# Patient Record
Sex: FEMALE | Race: BLACK OR AFRICAN AMERICAN | NOT HISPANIC OR LATINO | URBAN - METROPOLITAN AREA
[De-identification: names, ages, dates, MRNs, and addresses within clinical notes are randomized per-mention and may not be internally consistent; named-entity substitution may affect disease eponyms.]

---

## 2020-06-08 ENCOUNTER — LAB OUTSIDE AN ENCOUNTER (OUTPATIENT)
Dept: URBAN - METROPOLITAN AREA TELEHEALTH 2 | Facility: TELEHEALTH | Age: 50
End: 2020-06-08

## 2020-06-08 ENCOUNTER — OFFICE VISIT (OUTPATIENT)
Dept: URBAN - METROPOLITAN AREA TELEHEALTH 2 | Facility: TELEHEALTH | Age: 50
End: 2020-06-08
Payer: SELF-PAY

## 2020-06-08 DIAGNOSIS — R19.7 DIARRHEA: ICD-10-CM

## 2020-06-08 DIAGNOSIS — Z79.899 LONG-TERM USE OF IMMUNOSUPPRESSANT MEDICATION: ICD-10-CM

## 2020-06-08 DIAGNOSIS — K50.00 CROHN'S DISEASE OF ILEUM WITHOUT COMPLICATION: ICD-10-CM

## 2020-06-08 PROCEDURE — 99215 OFFICE O/P EST HI 40 MIN: CPT | Performed by: INTERNAL MEDICINE

## 2020-06-08 PROCEDURE — G9903 PT SCRN TBCO ID AS NON USER: HCPCS | Performed by: INTERNAL MEDICINE

## 2020-06-08 PROCEDURE — 1036F TOBACCO NON-USER: CPT | Performed by: INTERNAL MEDICINE

## 2020-06-08 PROCEDURE — 99213 OFFICE O/P EST LOW 20 MIN: CPT | Performed by: INTERNAL MEDICINE

## 2020-06-08 RX ORDER — CYANOCOBALAMIN 1000 UG/ML
1 ML ONCE A  WEEK INJECTION INTRAMUSCULAR; SUBCUTANEOUS
Qty: 1 | Refills: 11 | Status: ACTIVE | COMMUNITY
Start: 2019-09-06 | End: 2020-08-31

## 2020-06-08 RX ORDER — DIPHENHYDRAMINE HCL 2 %
AS DIRECTED CREAM (GRAM) TOPICAL
Qty: 30 | Refills: 0 | OUTPATIENT
Start: 2020-06-08 | End: 2020-07-08

## 2020-06-08 RX ORDER — ACETAMINOPHEN 650 MG
2 TABLETS AS NEEDED TABLET, EXTENDED RELEASE ORAL
Qty: 10 | Refills: 0 | OUTPATIENT
Start: 2020-06-08 | End: 2020-06-09

## 2020-06-08 RX ORDER — DICYCLOMINE HYDROCHLORIDE 20 MG/1
TAKE 1 TABLET BY ORAL ROUTE 4 TIMES A DAY AS NEEDED FOR 90 DAYS TABLET ORAL
Qty: 360 | Refills: 1 | Status: ACTIVE | COMMUNITY
Start: 2020-01-27 | End: 2020-07-25

## 2020-06-08 RX ORDER — AZATHIOPRINE 50 MG/1
AS DIRECTED TABLET ORAL
Qty: 30 | Refills: 2 | OUTPATIENT
Start: 2020-06-08 | End: 2020-09-06

## 2020-06-08 RX ORDER — HYDROCORTISONE SODIUM SUCCINATE 100 MG/2ML
AS DIRECTED INJECTION, POWDER, FOR SOLUTION INTRAMUSCULAR; INTRAVENOUS
Qty: 1 | Refills: 0 | OUTPATIENT
Start: 2020-06-08 | End: 2020-06-09

## 2020-06-08 RX ORDER — MYCOPHENOLATE MOFETIL 250 MG/1
TAKE 4 CAPSULES (1,000 MG) BY ORAL ROUTE 2 TIMES PER DAY CAPSULE ORAL 2
Qty: 0 | Refills: 0 | Status: ACTIVE | COMMUNITY
Start: 1900-01-01 | End: 1900-01-01

## 2020-06-08 RX ORDER — ESOMEPRAZOLE MAGNESIUM 40 MG
TAKE 1 CAPSULE BY ORAL ROUTE 2 TIMES A DAY FOR 90 DAYS CAPSULE,DELAYED RELEASE (ENTERIC COATED) ORAL 2
Qty: 180 | Refills: 2 | Status: ACTIVE | COMMUNITY
Start: 2019-10-24 | End: 2020-07-20

## 2020-06-08 RX ORDER — ERGOCALCIFEROL 1.25 MG/1
CAPSULE ORAL
Qty: 0 | Refills: 0 | Status: ACTIVE | COMMUNITY
Start: 1900-01-01 | End: 1900-01-01

## 2020-06-08 NOTE — HPI-OTHER HISTORIES
Pt Sonali is a 48 y/o indiv with ileal CD, currently on Remicade (every 4 weeks 5mg/kg), here for refractory disease.  Pt referred by Dr. Felder.  Pt was initially diagnosed with UC (in [place]) and then CD.  She has colectomy 1983 (9th grade).  She has liver transplant 2011 (on MMF and tacro).  She has not been on a lot of meds for her CD since her liver transplant.  She has also been on Remicade in the past and did really well with this, but it was not continued after liver transplant due to concerns about whether it would be ok after transplant. After starting Entyvio (around 5/2019) she felt really well.  Previously on 10/24/2019, pt reports that she has a lot of diarrhea and has been experiencing a lot of dehydration.  She has had a w/u of diarrhea (nl pancreatic elastase).  She has been adjusting her entyvio from every 6 weeks then to every 4 weeks.  She is on prednisone 5mg (tapered from dose of 40mg).  She has to empty out her ostomy about 4 times per day.  She has abdominal pain at the site of her stoma and gets very dehydrated.  She subjectively has noted that adjusting the dose of entyvio has improved somewhat.  Has lost some weight (after illness)  Previously, pt reports that she continues to have lot of diarrhea, lot of breakthrough sxs within week after her infusion.  Still on steroids.  Getting dehydrated;  PPI was not approved by insurance. Previously on 1/27/2020, pt reports that she has been feeling ill still after being discharged from hospital last week. Lot of diarrhea, lot of abdominal pain.  Previously on 3/9/2020, pt reports that after her first infusion of Remicade (without loading), she felt great.  She had some breakthrough sxs about 4 weeks after the remicade infusion.  She has tapered off of prednisone.  After getting her Remicade, her sxs improved.  Today on 6/8/2020, pt reports that she was hospitalized for flare and dehydration.  Her transplant liver doctor is okay with change to Imuran (not MTX).  She feels better after each Remicade infusion.  However, she feels ill about about 2 weeks afterwards.  SHe is on prednisone 40mg and is tapering.  Her BG are very high.  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  SH: Works as a coordinator   Labs: 4/14/2020: Remicade (trough at 4 weeks) 0 (undetected) and  12/2019: wbc 11.1, ast 121, alt 115, Cr 1.89,  9/19/2019: vedo level 36, Ab neg (infusion was given on 9/9/2019) C diff neg at OSH  5/2019: quant gold neg, Hep B neg; Hep C pos  1/2020:  1/30/2020: Colonoscopy via stoma The scope was advanced 40cm from the ostomy entry site. Patchy inflammation, moderate in severity and characterized by erosions, erythema, friability and granularity was found in the proximal ileum.  Biopsies were taken with a cold forceps for histology.  The pathology specimen was placed into Bottle Number 1. The proximal ileum appeared normal. Diffuse inflammation characterized by erosions, erythema, friability and granularity was found in the distal ileum. Biopsies were taken with a cold forceps for histology.  The pathology specimen was placed into Bottle Number 2. Overall, the proximal mucosa was normal.  The disease appears to be concentrated distally with area of normal mucosa proximal to this and then a skip lesions proximally (as per above) and then normal proxima to this.  A. Proximal Terminal I leum , Biopsy: -Moderate chronic active enteritis (see comment) -No granulomas, dysplasia, or malignancy. B. Terminal I leum , Biopsy: -Moderate chronic active enteritis (see comment) -No granulomas, dysplasia, or malignancy.  4/2019: Colonoscopy A. The findings are consistent with history of Crohn's disease. However, the findings are not specific. The  differential diagnosis also includes enteritis associated with NSAIDs or other medication, mucosal trauma. prolapse, or infection. No viral inclusions are seen. As always, primary inflammatory bowel disease is a  diagnosis of exclusion, other possible etiologies need to be clinically excluded.   A. Smal l Bowel , Biopsy: -Acute ileitis with erosion, consistent with clinical history of Crohn's, see comment. -No granulomas, negative for dysplasia or malignancy.  8/2016: mild active ileitis

## 2020-06-08 NOTE — HPI-TODAY'S VISIT:
More than half of the face-to-face time used for counseling and coordination of care. Patient seen today via telehealth by agreement and consent of patient in light of current COVID-19 pandemic. I used video conferencing during the visit. The patient encounter is appropriate and reasonable under the circumstances given the patient's particular presentation at this time. The patient has been advised of the followin) the potential risks and limitations of this mode of treatment (including but not limited to the absence of in-person examination); 2) the right to refuse telehealth services at any point without affecting the right to future care; 3) the right to receive in-person services, included immediately after this consultation if an urgent need arises; 4) information, including identifiable images or information from this telehealth consult, will only be shared in accordance with HIPAA regulations. Any and all of the patient's and/or patient's family member's questions on this issue have been answered. The patient has verbally consented to be treated via telehealth services. The patient has also been advised to contact this office for worsening conditions or problems, and seek emergency medical treatment and/or call 911 if the patient deems either necessary.

## 2020-06-09 ENCOUNTER — CLAIMS CREATED FROM THE CLAIM WINDOW (OUTPATIENT)
Dept: URBAN - METROPOLITAN AREA CLINIC 97 | Facility: CLINIC | Age: 50
End: 2020-06-09

## 2020-06-09 ENCOUNTER — CLAIMS CREATED FROM THE CLAIM WINDOW (OUTPATIENT)
Dept: URBAN - METROPOLITAN AREA CLINIC 97 | Facility: CLINIC | Age: 50
End: 2020-06-09
Payer: SELF-PAY

## 2020-06-09 ENCOUNTER — OFFICE VISIT (OUTPATIENT)
Dept: URBAN - METROPOLITAN AREA CLINIC 97 | Facility: CLINIC | Age: 50
End: 2020-06-09

## 2020-06-09 DIAGNOSIS — K50.80 CROHN'S COLITIS: ICD-10-CM

## 2020-06-09 PROCEDURE — 96375 TX/PRO/DX INJ NEW DRUG ADDON: CPT | Performed by: INTERNAL MEDICINE

## 2020-06-09 PROCEDURE — 96413 CHEMO IV INFUSION 1 HR: CPT | Performed by: INTERNAL MEDICINE

## 2020-06-09 PROCEDURE — 96415 CHEMO IV INFUSION ADDL HR: CPT | Performed by: INTERNAL MEDICINE

## 2020-06-09 RX ORDER — ACETAMINOPHEN 650 MG
2 TABLETS AS NEEDED TABLET, EXTENDED RELEASE ORAL
Qty: 10 | Refills: 0 | Status: ACTIVE | COMMUNITY
Start: 2020-06-08 | End: 2020-06-09

## 2020-06-09 RX ORDER — DICYCLOMINE HYDROCHLORIDE 20 MG/1
TAKE 1 TABLET BY ORAL ROUTE 4 TIMES A DAY AS NEEDED FOR 90 DAYS TABLET ORAL
Qty: 360 | Refills: 1 | Status: ACTIVE | COMMUNITY
Start: 2020-01-27 | End: 2020-07-25

## 2020-06-09 RX ORDER — CYANOCOBALAMIN 1000 UG/ML
1 ML ONCE A  WEEK INJECTION INTRAMUSCULAR; SUBCUTANEOUS
Qty: 1 | Refills: 11 | Status: ACTIVE | COMMUNITY
Start: 2019-09-06 | End: 2020-08-31

## 2020-06-09 RX ORDER — ESOMEPRAZOLE MAGNESIUM 40 MG
TAKE 1 CAPSULE BY ORAL ROUTE 2 TIMES A DAY FOR 90 DAYS CAPSULE,DELAYED RELEASE (ENTERIC COATED) ORAL 2
Qty: 180 | Refills: 2 | Status: ACTIVE | COMMUNITY
Start: 2019-10-24 | End: 2020-07-20

## 2020-06-09 RX ORDER — AZATHIOPRINE 50 MG/1
AS DIRECTED TABLET ORAL
Qty: 30 | Refills: 2 | Status: ACTIVE | COMMUNITY
Start: 2020-06-08 | End: 2020-09-06

## 2020-06-09 RX ORDER — MYCOPHENOLATE MOFETIL 250 MG/1
TAKE 4 CAPSULES (1,000 MG) BY ORAL ROUTE 2 TIMES PER DAY CAPSULE ORAL 2
Qty: 0 | Refills: 0 | Status: ACTIVE | COMMUNITY
Start: 1900-01-01 | End: 1900-01-01

## 2020-06-09 RX ORDER — HYDROCORTISONE SODIUM SUCCINATE 100 MG/2ML
AS DIRECTED INJECTION, POWDER, FOR SOLUTION INTRAMUSCULAR; INTRAVENOUS
Qty: 1 | Refills: 0 | Status: ACTIVE | COMMUNITY
Start: 2020-06-08 | End: 2020-06-09

## 2020-06-09 RX ORDER — DIPHENHYDRAMINE HCL 2 %
AS DIRECTED CREAM (GRAM) TOPICAL
Qty: 30 | Refills: 0 | Status: ACTIVE | COMMUNITY
Start: 2020-06-08 | End: 2020-07-08

## 2020-06-09 RX ORDER — ERGOCALCIFEROL 1.25 MG/1
CAPSULE ORAL
Qty: 0 | Refills: 0 | Status: ACTIVE | COMMUNITY
Start: 1900-01-01 | End: 1900-01-01

## 2020-06-10 LAB
BASO (ABSOLUTE): 0
BASOS: 0
EOS (ABSOLUTE): 0
EOS: 0
HEMATOCRIT: 31.7
HEMATOLOGY COMMENTS:: (no result)
HEMOGLOBIN: 10.2
IMMATURE CELLS: (no result)
IMMATURE GRANS (ABS): 0
IMMATURE GRANULOCYTES: 0
LYMPHS (ABSOLUTE): 1.6
LYMPHS: 13
MCH: 27.7
MCHC: 32.2
MCV: 86
MONOCYTES(ABSOLUTE): 0.7
MONOCYTES: 5
NEUTROPHILS (ABSOLUTE): 10.1
NEUTROPHILS: 82
NRBC: (no result)
PLATELETS: 154
RBC: 3.68
RDW: 14.7
WBC: 12.4

## 2020-06-15 ENCOUNTER — LAB OUTSIDE AN ENCOUNTER (OUTPATIENT)
Dept: URBAN - METROPOLITAN AREA TELEHEALTH 2 | Facility: TELEHEALTH | Age: 50
End: 2020-06-15

## 2020-07-07 ENCOUNTER — OFFICE VISIT (OUTPATIENT)
Dept: URBAN - METROPOLITAN AREA CLINIC 97 | Facility: CLINIC | Age: 50
End: 2020-07-07

## 2020-07-07 RX ORDER — AZATHIOPRINE 50 MG/1
AS DIRECTED TABLET ORAL
Qty: 30 | Refills: 2 | Status: ACTIVE | COMMUNITY
Start: 2020-06-08 | End: 2020-09-06

## 2020-07-07 RX ORDER — ERGOCALCIFEROL 1.25 MG/1
CAPSULE ORAL
Qty: 0 | Refills: 0 | Status: ACTIVE | COMMUNITY
Start: 1900-01-01 | End: 1900-01-01

## 2020-07-07 RX ORDER — ESOMEPRAZOLE MAGNESIUM 40 MG
TAKE 1 CAPSULE BY ORAL ROUTE 2 TIMES A DAY FOR 90 DAYS CAPSULE,DELAYED RELEASE (ENTERIC COATED) ORAL 2
Qty: 180 | Refills: 2 | Status: ACTIVE | COMMUNITY
Start: 2019-10-24 | End: 2020-07-20

## 2020-07-07 RX ORDER — DIPHENHYDRAMINE HCL 2 %
AS DIRECTED CREAM (GRAM) TOPICAL
Qty: 30 | Refills: 0 | Status: ACTIVE | COMMUNITY
Start: 2020-06-08 | End: 2020-07-08

## 2020-07-07 RX ORDER — DICYCLOMINE HYDROCHLORIDE 20 MG/1
TAKE 1 TABLET BY ORAL ROUTE 4 TIMES A DAY AS NEEDED FOR 90 DAYS TABLET ORAL
Qty: 360 | Refills: 1 | Status: ACTIVE | COMMUNITY
Start: 2020-01-27 | End: 2020-07-25

## 2020-07-07 RX ORDER — MYCOPHENOLATE MOFETIL 250 MG/1
TAKE 4 CAPSULES (1,000 MG) BY ORAL ROUTE 2 TIMES PER DAY CAPSULE ORAL 2
Qty: 0 | Refills: 0 | Status: ACTIVE | COMMUNITY
Start: 1900-01-01 | End: 1900-01-01

## 2020-07-07 RX ORDER — CYANOCOBALAMIN 1000 UG/ML
1 ML ONCE A  WEEK INJECTION INTRAMUSCULAR; SUBCUTANEOUS
Qty: 1 | Refills: 11 | Status: ACTIVE | COMMUNITY
Start: 2019-09-06 | End: 2020-08-31

## 2020-08-04 ENCOUNTER — OFFICE VISIT (OUTPATIENT)
Dept: URBAN - METROPOLITAN AREA CLINIC 97 | Facility: CLINIC | Age: 50
End: 2020-08-04
Payer: SELF-PAY

## 2020-08-04 VITALS
SYSTOLIC BLOOD PRESSURE: 129 MMHG | TEMPERATURE: 97.7 F | BODY MASS INDEX: 19.26 KG/M2 | HEIGHT: 69 IN | DIASTOLIC BLOOD PRESSURE: 87 MMHG | WEIGHT: 130 LBS | RESPIRATION RATE: 18 BRPM

## 2020-08-04 DIAGNOSIS — K50.80 CROHN'S DISEASE OF BOTH SMALL AND LARGE INTESTINE: ICD-10-CM

## 2020-08-04 PROCEDURE — 96413 CHEMO IV INFUSION 1 HR: CPT | Performed by: INTERNAL MEDICINE

## 2020-08-04 PROCEDURE — 96375 TX/PRO/DX INJ NEW DRUG ADDON: CPT | Performed by: INTERNAL MEDICINE

## 2020-08-04 PROCEDURE — 96415 CHEMO IV INFUSION ADDL HR: CPT | Performed by: INTERNAL MEDICINE

## 2020-08-04 RX ORDER — CYANOCOBALAMIN 1000 UG/ML
1 ML ONCE A  WEEK INJECTION INTRAMUSCULAR; SUBCUTANEOUS
Qty: 1 | Refills: 11 | Status: ACTIVE | COMMUNITY
Start: 2019-09-06 | End: 2020-08-31

## 2020-08-04 RX ORDER — ERGOCALCIFEROL 1.25 MG/1
CAPSULE ORAL
Qty: 0 | Refills: 0 | Status: ACTIVE | COMMUNITY
Start: 1900-01-01 | End: 1900-01-01

## 2020-08-04 RX ORDER — MYCOPHENOLATE MOFETIL 250 MG/1
TAKE 4 CAPSULES (1,000 MG) BY ORAL ROUTE 2 TIMES PER DAY CAPSULE ORAL 2
Qty: 0 | Refills: 0 | Status: ACTIVE | COMMUNITY
Start: 1900-01-01 | End: 1900-01-01

## 2020-08-04 RX ORDER — AZATHIOPRINE 50 MG/1
AS DIRECTED TABLET ORAL
Qty: 30 | Refills: 2 | Status: ACTIVE | COMMUNITY
Start: 2020-06-08 | End: 2020-09-06

## 2020-09-01 ENCOUNTER — OFFICE VISIT (OUTPATIENT)
Dept: URBAN - METROPOLITAN AREA CLINIC 97 | Facility: CLINIC | Age: 50
End: 2020-09-01

## 2020-09-04 ENCOUNTER — OFFICE VISIT (OUTPATIENT)
Dept: URBAN - METROPOLITAN AREA CLINIC 97 | Facility: CLINIC | Age: 50
End: 2020-09-04
Payer: SELF-PAY

## 2020-09-04 VITALS
HEIGHT: 69 IN | RESPIRATION RATE: 18 BRPM | DIASTOLIC BLOOD PRESSURE: 95 MMHG | WEIGHT: 129 LBS | BODY MASS INDEX: 19.11 KG/M2 | TEMPERATURE: 97.8 F | SYSTOLIC BLOOD PRESSURE: 141 MMHG

## 2020-09-04 DIAGNOSIS — K50.80 CROHN'S COLITIS: ICD-10-CM

## 2020-09-04 PROCEDURE — 96415 CHEMO IV INFUSION ADDL HR: CPT | Performed by: INTERNAL MEDICINE

## 2020-09-04 PROCEDURE — 96375 TX/PRO/DX INJ NEW DRUG ADDON: CPT | Performed by: INTERNAL MEDICINE

## 2020-09-04 PROCEDURE — 96413 CHEMO IV INFUSION 1 HR: CPT | Performed by: INTERNAL MEDICINE

## 2020-09-04 RX ORDER — AZATHIOPRINE 50 MG/1
AS DIRECTED TABLET ORAL
Qty: 30 | Refills: 2 | Status: ACTIVE | COMMUNITY
Start: 2020-06-08 | End: 2020-09-06

## 2020-09-04 RX ORDER — MYCOPHENOLATE MOFETIL 250 MG/1
TAKE 4 CAPSULES (1,000 MG) BY ORAL ROUTE 2 TIMES PER DAY CAPSULE ORAL 2
Qty: 0 | Refills: 0 | Status: ACTIVE | COMMUNITY
Start: 1900-01-01 | End: 1900-01-01

## 2020-09-04 RX ORDER — ERGOCALCIFEROL 1.25 MG/1
CAPSULE ORAL
Qty: 0 | Refills: 0 | Status: ACTIVE | COMMUNITY
Start: 1900-01-01 | End: 1900-01-01

## 2020-09-29 ENCOUNTER — OFFICE VISIT (OUTPATIENT)
Dept: URBAN - METROPOLITAN AREA CLINIC 97 | Facility: CLINIC | Age: 50
End: 2020-09-29
Payer: SELF-PAY

## 2020-09-29 VITALS
HEIGHT: 69 IN | RESPIRATION RATE: 19 BRPM | SYSTOLIC BLOOD PRESSURE: 122 MMHG | HEART RATE: 97 BPM | TEMPERATURE: 97.6 F | WEIGHT: 129 LBS | DIASTOLIC BLOOD PRESSURE: 99 MMHG | BODY MASS INDEX: 19.11 KG/M2

## 2020-09-29 DIAGNOSIS — K50.80 CROHN'S COLITIS: ICD-10-CM

## 2020-09-29 PROCEDURE — 96375 TX/PRO/DX INJ NEW DRUG ADDON: CPT | Performed by: INTERNAL MEDICINE

## 2020-09-29 PROCEDURE — 96413 CHEMO IV INFUSION 1 HR: CPT | Performed by: INTERNAL MEDICINE

## 2020-09-29 PROCEDURE — 96415 CHEMO IV INFUSION ADDL HR: CPT | Performed by: INTERNAL MEDICINE

## 2020-09-29 RX ORDER — MYCOPHENOLATE MOFETIL 250 MG/1
TAKE 4 CAPSULES (1,000 MG) BY ORAL ROUTE 2 TIMES PER DAY CAPSULE ORAL 2
Qty: 0 | Refills: 0 | Status: ACTIVE | COMMUNITY
Start: 1900-01-01 | End: 1900-01-01

## 2020-09-29 RX ORDER — ERGOCALCIFEROL 1.25 MG/1
CAPSULE ORAL
Qty: 0 | Refills: 0 | Status: ACTIVE | COMMUNITY
Start: 1900-01-01 | End: 1900-01-01

## 2020-10-27 ENCOUNTER — OFFICE VISIT (OUTPATIENT)
Dept: URBAN - METROPOLITAN AREA CLINIC 97 | Facility: CLINIC | Age: 50
End: 2020-10-27
Payer: SELF-PAY

## 2020-10-27 VITALS
BODY MASS INDEX: 20.14 KG/M2 | HEIGHT: 69 IN | WEIGHT: 136 LBS | TEMPERATURE: 97.6 F | HEART RATE: 98 BPM | SYSTOLIC BLOOD PRESSURE: 120 MMHG | DIASTOLIC BLOOD PRESSURE: 88 MMHG

## 2020-10-27 DIAGNOSIS — K50.80 CROHN'S COLITIS: ICD-10-CM

## 2020-10-27 PROCEDURE — 96413 CHEMO IV INFUSION 1 HR: CPT | Performed by: INTERNAL MEDICINE

## 2020-10-27 PROCEDURE — 96375 TX/PRO/DX INJ NEW DRUG ADDON: CPT | Performed by: INTERNAL MEDICINE

## 2020-10-27 PROCEDURE — 96415 CHEMO IV INFUSION ADDL HR: CPT | Performed by: INTERNAL MEDICINE

## 2020-10-27 RX ORDER — MYCOPHENOLATE MOFETIL 250 MG/1
TAKE 4 CAPSULES (1,000 MG) BY ORAL ROUTE 2 TIMES PER DAY CAPSULE ORAL 2
Qty: 0 | Refills: 0 | Status: ACTIVE | COMMUNITY
Start: 1900-01-01 | End: 1900-01-01

## 2020-10-27 RX ORDER — ERGOCALCIFEROL 1.25 MG/1
CAPSULE ORAL
Qty: 0 | Refills: 0 | Status: ACTIVE | COMMUNITY
Start: 1900-01-01 | End: 1900-01-01

## 2020-11-24 ENCOUNTER — OFFICE VISIT (OUTPATIENT)
Dept: URBAN - METROPOLITAN AREA CLINIC 97 | Facility: CLINIC | Age: 50
End: 2020-11-24
Payer: SELF-PAY

## 2020-11-24 VITALS
HEART RATE: 103 BPM | WEIGHT: 136 LBS | RESPIRATION RATE: 16 BRPM | TEMPERATURE: 97 F | DIASTOLIC BLOOD PRESSURE: 81 MMHG | HEIGHT: 69 IN | SYSTOLIC BLOOD PRESSURE: 120 MMHG | BODY MASS INDEX: 20.14 KG/M2

## 2020-11-24 DIAGNOSIS — K50.80 CROHN'S COLITIS: ICD-10-CM

## 2020-11-24 PROCEDURE — 96415 CHEMO IV INFUSION ADDL HR: CPT | Performed by: INTERNAL MEDICINE

## 2020-11-24 PROCEDURE — 96413 CHEMO IV INFUSION 1 HR: CPT | Performed by: INTERNAL MEDICINE

## 2020-11-24 PROCEDURE — 96375 TX/PRO/DX INJ NEW DRUG ADDON: CPT | Performed by: INTERNAL MEDICINE

## 2020-11-24 RX ORDER — MYCOPHENOLATE MOFETIL 250 MG/1
TAKE 4 CAPSULES (1,000 MG) BY ORAL ROUTE 2 TIMES PER DAY CAPSULE ORAL 2
Qty: 0 | Refills: 0 | Status: ACTIVE | COMMUNITY
Start: 1900-01-01 | End: 1900-01-01

## 2020-11-24 RX ORDER — ERGOCALCIFEROL 1.25 MG/1
CAPSULE ORAL
Qty: 0 | Refills: 0 | Status: ACTIVE | COMMUNITY
Start: 1900-01-01 | End: 1900-01-01

## 2020-12-15 ENCOUNTER — OFFICE VISIT (OUTPATIENT)
Dept: URBAN - METROPOLITAN AREA TELEHEALTH 2 | Facility: TELEHEALTH | Age: 50
End: 2020-12-15
Payer: SELF-PAY

## 2020-12-15 DIAGNOSIS — Z79.899 LONG-TERM USE OF IMMUNOSUPPRESSANT MEDICATION: ICD-10-CM

## 2020-12-15 DIAGNOSIS — R19.7 DIARRHEA: ICD-10-CM

## 2020-12-15 DIAGNOSIS — K50.80 CROHN'S DISEASE OF BOTH SMALL AND LARGE INTESTINE WITHOUT COMPLICATION: ICD-10-CM

## 2020-12-15 DIAGNOSIS — Z94.4 LIVER TRANSPLANT: ICD-10-CM

## 2020-12-15 PROCEDURE — G9622 NO UNHEAL ETOH USER: HCPCS | Performed by: INTERNAL MEDICINE

## 2020-12-15 PROCEDURE — 99215 OFFICE O/P EST HI 40 MIN: CPT | Performed by: INTERNAL MEDICINE

## 2020-12-15 PROCEDURE — G8482 FLU IMMUNIZE ORDER/ADMIN: HCPCS | Performed by: INTERNAL MEDICINE

## 2020-12-15 PROCEDURE — G8427 DOCREV CUR MEDS BY ELIG CLIN: HCPCS | Performed by: INTERNAL MEDICINE

## 2020-12-15 PROCEDURE — G8420 CALC BMI NORM PARAMETERS: HCPCS | Performed by: INTERNAL MEDICINE

## 2020-12-15 PROCEDURE — 1036F TOBACCO NON-USER: CPT | Performed by: INTERNAL MEDICINE

## 2020-12-15 PROCEDURE — G9903 PT SCRN TBCO ID AS NON USER: HCPCS | Performed by: INTERNAL MEDICINE

## 2020-12-15 PROCEDURE — 3017F COLORECTAL CA SCREEN DOC REV: CPT | Performed by: INTERNAL MEDICINE

## 2020-12-15 RX ORDER — DIPHENHYDRAMINE HCL 2 %
AS DIRECTED CREAM (GRAM) TOPICAL
Qty: 30 | Refills: 0 | OUTPATIENT
Start: 2020-12-15 | End: 2021-01-14

## 2020-12-15 RX ORDER — ACETAMINOPHEN 650 MG
2 TABLETS AS NEEDED TABLET, EXTENDED RELEASE ORAL
Qty: 10 | Refills: 0 | OUTPATIENT
Start: 2020-12-15 | End: 2020-12-16

## 2020-12-15 RX ORDER — MYCOPHENOLATE MOFETIL 250 MG/1
TAKE 4 CAPSULES (1,000 MG) BY ORAL ROUTE 2 TIMES PER DAY CAPSULE ORAL 2
Qty: 0 | Refills: 0 | Status: ACTIVE | COMMUNITY
Start: 1900-01-01 | End: 1900-01-01

## 2020-12-15 RX ORDER — ERGOCALCIFEROL 1.25 MG/1
CAPSULE ORAL
Qty: 0 | Refills: 0 | Status: ACTIVE | COMMUNITY
Start: 1900-01-01 | End: 1900-01-01

## 2020-12-15 RX ORDER — HYDROCORTISONE SODIUM SUCCINATE 100 MG/2ML
AS DIRECTED INJECTION, POWDER, FOR SOLUTION INTRAMUSCULAR; INTRAVENOUS
Qty: 1 | Refills: 0 | OUTPATIENT
Start: 2020-12-15 | End: 2020-12-16

## 2020-12-15 NOTE — HPI-TODAY'S VISIT:
Pt Sonali is a 49 y/o indiv with ileal CD, currently on Remicade (every 4 weeks 5mg/kg), here for refractory disease. . Pt referred by Dr. Felder. . Pt was initially diagnosed with UC (in [place]) and then CD.  She has colectomy 1983 (9th grade).  She has liver transplant 2011 (on MMF and tacro).  She has not been on a lot of meds for her CD since her liver transplant.  She has also been on Remicade in the past and did really well with this, but it was not continued after liver transplant due to concerns about whether it would be ok after transplant. After starting Entyvio (around 5/2019) she felt really well.  Previously on 10/24/2019, pt reports that she has a lot of diarrhea and has been experiencing a lot of dehydration.  She has had a w/u of diarrhea (nl pancreatic elastase).  She has been adjusting her entyvio from every 6 weeks then to every 4 weeks.  She is on prednisone 5mg (tapered from dose of 40mg).  She has to empty out her ostomy about 4 times per day.  She has abdominal pain at the site of her stoma and gets very dehydrated.  She subjectively has noted that adjusting the dose of entyvio has improved somewhat.  Has lost some weight (after illness)  Previously, pt reports that she continues to have lot of diarrhea, lot of breakthrough sxs within week after her infusion.  Still on steroids.  Getting dehydrated;  PPI was not approved by insurance. Previously on 1/27/2020, pt reports that she has been feeling ill still after being discharged from hospital last week. Lot of diarrhea, lot of abdominal pain. . Previously on 3/9/2020, pt reports that after her first infusion of Remicade (without loading), she felt great.  She had some breakthrough sxs about 4 weeks after the remicade infusion.  She has tapered off of prednisone.  After getting her Remicade, her sxs improved. . Today on 12/15/2020, pt reports that her OLTx team has increased her MMF dose (to help with the AB).  She continues to have nausea with only 1 meal per day; she has had difficulty gaining weight.  There is not blood in the stool, but has diarrhea still.  Was given 2 iron infusions for her anemia. . 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 5/2019: quant gold neg, Hep B neg; Hep C pos .  1/30/2020: Colonoscopy via stoma The scope was advanced 40cm from the ostomy entry site. Patchy inflammation, moderate in severity and characterized by erosions, erythema, friability and granularity was found in the proximal ileum.  Biopsies were taken with a cold forceps for histology.  The pathology specimen was placed into Bottle Number 1. The proximal ileum appeared normal. Diffuse inflammation characterized by erosions, erythema, friability and granularity was found in the distal ileum. Biopsies were taken with a cold forceps for histology.  The pathology specimen was placed into Bottle Number 2. Overall, the proximal mucosa was normal.  The disease appears to be concentrated distally with area of normal mucosa proximal to this and then a skip lesions proximally (as per above) and then normal proxima to this.  A. Proximal Terminal I leum , Biopsy: -Moderate chronic active enteritis (see comment) -No granulomas, dysplasia, or malignancy. B. Terminal I leum , Biopsy: -Moderate chronic active enteritis (see comment) -No granulomas, dysplasia, or malignancy. . 4/2019: Colonoscopy A. The findings are consistent with history of Crohn's disease. However, the findings are not specific. The  differential diagnosis also includes enteritis associated with NSAIDs or other medication, mucosal trauma. prolapse, or infection. No viral inclusions are seen. As always, primary inflammatory bowel disease is a  diagnosis of exclusion, other possible etiologies need to be clinically excluded.  . A. Smal l Bowel , Biopsy: -Acute ileitis with erosion, consistent with clinical history of Crohn's, see comment. -No granulomas, negative for dysplasia or malignancy. . 8/2016: mild active ileitis

## 2020-12-22 ENCOUNTER — OFFICE VISIT (OUTPATIENT)
Dept: URBAN - METROPOLITAN AREA CLINIC 97 | Facility: CLINIC | Age: 50
End: 2020-12-22
Payer: SELF-PAY

## 2020-12-22 ENCOUNTER — LAB OUTSIDE AN ENCOUNTER (OUTPATIENT)
Dept: URBAN - METROPOLITAN AREA TELEHEALTH 2 | Facility: TELEHEALTH | Age: 50
End: 2020-12-22

## 2020-12-22 VITALS
HEIGHT: 69 IN | RESPIRATION RATE: 18 BRPM | SYSTOLIC BLOOD PRESSURE: 101 MMHG | TEMPERATURE: 97.6 F | BODY MASS INDEX: 19.7 KG/M2 | DIASTOLIC BLOOD PRESSURE: 90 MMHG | WEIGHT: 133 LBS

## 2020-12-22 DIAGNOSIS — K50.80 CROHN'S COLITIS: ICD-10-CM

## 2020-12-22 PROCEDURE — 96413 CHEMO IV INFUSION 1 HR: CPT | Performed by: INTERNAL MEDICINE

## 2020-12-22 PROCEDURE — 96415 CHEMO IV INFUSION ADDL HR: CPT | Performed by: INTERNAL MEDICINE

## 2020-12-22 PROCEDURE — 96375 TX/PRO/DX INJ NEW DRUG ADDON: CPT | Performed by: INTERNAL MEDICINE

## 2020-12-22 RX ORDER — MYCOPHENOLATE MOFETIL 250 MG/1
TAKE 4 CAPSULES (1,000 MG) BY ORAL ROUTE 2 TIMES PER DAY CAPSULE ORAL 2
Qty: 0 | Refills: 0 | Status: ACTIVE | COMMUNITY
Start: 1900-01-01 | End: 1900-01-01

## 2020-12-22 RX ORDER — DIPHENHYDRAMINE HCL 2 %
AS DIRECTED CREAM (GRAM) TOPICAL
Qty: 30 | Refills: 0 | Status: ACTIVE | COMMUNITY
Start: 2020-12-15 | End: 2021-01-14

## 2020-12-22 RX ORDER — ERGOCALCIFEROL 1.25 MG/1
CAPSULE ORAL
Qty: 0 | Refills: 0 | Status: ACTIVE | COMMUNITY
Start: 1900-01-01 | End: 1900-01-01

## 2021-01-20 ENCOUNTER — OFFICE VISIT (OUTPATIENT)
Dept: URBAN - METROPOLITAN AREA CLINIC 97 | Facility: CLINIC | Age: 51
End: 2021-01-20
Payer: SELF-PAY

## 2021-01-20 ENCOUNTER — TELEPHONE ENCOUNTER (OUTPATIENT)
Dept: URBAN - METROPOLITAN AREA CLINIC 18 | Facility: CLINIC | Age: 51
End: 2021-01-20

## 2021-01-20 DIAGNOSIS — K50.80 CROHN'S COLITIS: ICD-10-CM

## 2021-01-20 PROCEDURE — 96375 TX/PRO/DX INJ NEW DRUG ADDON: CPT | Performed by: INTERNAL MEDICINE

## 2021-01-20 PROCEDURE — 96415 CHEMO IV INFUSION ADDL HR: CPT | Performed by: INTERNAL MEDICINE

## 2021-01-20 PROCEDURE — 96413 CHEMO IV INFUSION 1 HR: CPT | Performed by: INTERNAL MEDICINE

## 2021-01-20 RX ORDER — MYCOPHENOLATE MOFETIL 250 MG/1
TAKE 4 CAPSULES (1,000 MG) BY ORAL ROUTE 2 TIMES PER DAY CAPSULE ORAL 2
Qty: 0 | Refills: 0 | Status: ACTIVE | COMMUNITY
Start: 1900-01-01 | End: 1900-01-01

## 2021-01-20 RX ORDER — ERGOCALCIFEROL 1.25 MG/1
CAPSULE ORAL
Qty: 0 | Refills: 0 | Status: ACTIVE | COMMUNITY
Start: 1900-01-01 | End: 1900-01-01

## 2021-02-17 ENCOUNTER — OFFICE VISIT (OUTPATIENT)
Dept: URBAN - METROPOLITAN AREA CLINIC 97 | Facility: CLINIC | Age: 51
End: 2021-02-17
Payer: SELF-PAY

## 2021-02-17 ENCOUNTER — TELEPHONE ENCOUNTER (OUTPATIENT)
Dept: URBAN - METROPOLITAN AREA CLINIC 18 | Facility: CLINIC | Age: 51
End: 2021-02-17

## 2021-02-17 DIAGNOSIS — K50.80 CROHN'S COLITIS: ICD-10-CM

## 2021-02-17 PROCEDURE — 96415 CHEMO IV INFUSION ADDL HR: CPT | Performed by: INTERNAL MEDICINE

## 2021-02-17 PROCEDURE — 96413 CHEMO IV INFUSION 1 HR: CPT | Performed by: INTERNAL MEDICINE

## 2021-02-17 PROCEDURE — 96375 TX/PRO/DX INJ NEW DRUG ADDON: CPT | Performed by: INTERNAL MEDICINE

## 2021-02-17 RX ORDER — ERGOCALCIFEROL 1.25 MG/1
CAPSULE ORAL
Qty: 0 | Refills: 0 | Status: ACTIVE | COMMUNITY
Start: 1900-01-01 | End: 1900-01-01

## 2021-02-17 RX ORDER — MYCOPHENOLATE MOFETIL 250 MG/1
TAKE 4 CAPSULES (1,000 MG) BY ORAL ROUTE 2 TIMES PER DAY CAPSULE ORAL 2
Qty: 0 | Refills: 0 | Status: ACTIVE | COMMUNITY
Start: 1900-01-01 | End: 1900-01-01

## 2021-03-10 ENCOUNTER — TELEPHONE ENCOUNTER (OUTPATIENT)
Dept: URBAN - METROPOLITAN AREA CLINIC 98 | Facility: CLINIC | Age: 51
End: 2021-03-10

## 2021-03-10 ENCOUNTER — OFFICE VISIT (OUTPATIENT)
Dept: URBAN - METROPOLITAN AREA CLINIC 98 | Facility: CLINIC | Age: 51
End: 2021-03-10
Payer: SELF-PAY

## 2021-03-10 ENCOUNTER — LAB OUTSIDE AN ENCOUNTER (OUTPATIENT)
Dept: URBAN - METROPOLITAN AREA CLINIC 98 | Facility: CLINIC | Age: 51
End: 2021-03-10

## 2021-03-10 VITALS
HEART RATE: 103 BPM | BODY MASS INDEX: 20.06 KG/M2 | DIASTOLIC BLOOD PRESSURE: 91 MMHG | SYSTOLIC BLOOD PRESSURE: 130 MMHG | WEIGHT: 135.4 LBS | TEMPERATURE: 97.7 F | HEIGHT: 69 IN

## 2021-03-10 DIAGNOSIS — R19.7 DIARRHEA: ICD-10-CM

## 2021-03-10 DIAGNOSIS — Z94.4 LIVER TRANSPLANT: ICD-10-CM

## 2021-03-10 DIAGNOSIS — R13.10 DYSPHAGIA: ICD-10-CM

## 2021-03-10 DIAGNOSIS — K50.80 CROHN'S DISEASE OF BOTH SMALL AND LARGE INTESTINE WITHOUT COMPLICATION: ICD-10-CM

## 2021-03-10 PROCEDURE — 99215 OFFICE O/P EST HI 40 MIN: CPT | Performed by: INTERNAL MEDICINE

## 2021-03-10 RX ORDER — MYCOPHENOLATE MOFETIL 250 MG/1
TAKE 4 CAPSULES (1,000 MG) BY ORAL ROUTE 2 TIMES PER DAY CAPSULE ORAL 2
Qty: 0 | Refills: 0 | Status: ACTIVE | COMMUNITY
Start: 1900-01-01

## 2021-03-10 RX ORDER — ERGOCALCIFEROL 1.25 MG/1
CAPSULE ORAL
Qty: 0 | Refills: 0 | Status: ACTIVE | COMMUNITY
Start: 1900-01-01

## 2021-03-10 RX ORDER — PANTOPRAZOLE SODIUM 40 MG/1
1 TABLET TABLET, DELAYED RELEASE ORAL BID
Qty: 60 TABLET | Refills: 11 | OUTPATIENT
Start: 2021-03-10

## 2021-03-10 RX ORDER — ONDANSETRON HYDROCHLORIDE 4 MG/1
1 TABLET TABLET, FILM COATED ORAL
Qty: 60 | Refills: 1 | OUTPATIENT
Start: 2021-03-10 | End: 2021-05-09

## 2021-03-10 NOTE — HPI-OTHER HISTORIES
Pt Sonali is a 52 y/o indiv with ileal CD, currently on Remicade (every 4 weeks 5mg/kg), here for refractory disease. . Pt referred by Dr. Felder. . Pt was initially diagnosed with UC (in [place]) and then CD.  She has colectomy 1983 (9th grade).  She has liver transplant 2011 (on MMF and tacro).  She has not been on a lot of meds for her CD since her liver transplant.  She has also been on Remicade in the past and did really well with this, but it was not continued after liver transplant due to concerns about whether it would be ok after transplant. After starting Entyvio (around 5/2019) she felt really well.  Previously on 10/24/2019, pt reports that she has a lot of diarrhea and has been experiencing a lot of dehydration.  She has had a w/u of diarrhea (nl pancreatic elastase).  She has been adjusting her entyvio from every 6 weeks then to every 4 weeks.  She is on prednisone 5mg (tapered from dose of 40mg).  She has to empty out her ostomy about 4 times per day.  She has abdominal pain at the site of her stoma and gets very dehydrated.  She subjectively has noted that adjusting the dose of entyvio has improved somewhat.  Has lost some weight (after illness)  Previously, pt reports that she continues to have lot of diarrhea, lot of breakthrough sxs within week after her infusion.  Still on steroids.  Getting dehydrated;  PPI was not approved by insurance. Previously on 1/27/2020, pt reports that she has been feeling ill still after being discharged from hospital last week. Lot of diarrhea, lot of abdominal pain. . Previously on 3/9/2020, pt reports that after her first infusion of Remicade (without loading), she felt great.  She had some breakthrough sxs about 4 weeks after the remicade infusion.  She has tapered off of prednisone.  After getting her Remicade, her sxs improved. . Previously on 12/15/2020, pt reports that her OLTx team has increased her MMF dose (to help with the AB).  She continues to have nausea with only 1 meal per day; she has had difficulty gaining weight.  There is not blood in the stool, but has diarrhea still.  Was given 2 iron infusions for her anemia. . Today on 3/10/2021, pt reports that she is now on imuran (no longer on cellept).  Seeing improvement in terms of her GI sxs, however, she is having a sensation that food is getting stuck in her esophagus and in the mid-abd area.  She also has pain in her stoma as well and has to relieve the distention by massage. She has mild nausea. . 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 . 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 5/2019: quant gold neg, Hep B neg; Hep C pos . 1/30/2020: Colonoscopy via stoma The scope was advanced 40cm from the ostomy entry site. Patchy inflammation, moderate in severity and characterized by erosions, erythema, friability and granularity was found in the proximal ileum.  Biopsies were taken with a cold forceps for histology.  The pathology specimen was placed into Bottle Number 1. The proximal ileum appeared normal. Diffuse inflammation characterized by erosions, erythema, friability and granularity was found in the distal ileum. Biopsies were taken with a cold forceps for histology.  The pathology specimen was placed into Bottle Number 2. Overall, the proximal mucosa was normal.  The disease appears to be concentrated distally with area of normal mucosa proximal to this and then a skip lesions proximally (as per above) and then normal proxima to this.  A. Proximal Terminal I leum , Biopsy: -Moderate chronic active enteritis (see comment) -No granulomas, dysplasia, or malignancy. B. Terminal I leum , Biopsy: -Moderate chronic active enteritis (see comment) -No granulomas, dysplasia, or malignancy. . 4/2019: Colonoscopy A. The findings are consistent with history of Crohn's disease. However, the findings are not specific. The  differential diagnosis also includes enteritis associated with NSAIDs or other medication, mucosal trauma. prolapse, or infection. No viral inclusions are seen. As always, primary inflammatory bowel disease is a  diagnosis of exclusion, other possible etiologies need to be clinically excluded.  . LYNETTE Anguiano l Bowel , Biopsy: -Acute ileitis with erosion, consistent with clinical history of Crohn's, see comment. -No granulomas, negative for dysplasia or malignancy. . 8/2016: mild active ileitis .

## 2021-03-17 ENCOUNTER — OFFICE VISIT (OUTPATIENT)
Dept: URBAN - METROPOLITAN AREA CLINIC 97 | Facility: CLINIC | Age: 51
End: 2021-03-17
Payer: SELF-PAY

## 2021-03-17 ENCOUNTER — LAB OUTSIDE AN ENCOUNTER (OUTPATIENT)
Dept: URBAN - METROPOLITAN AREA CLINIC 98 | Facility: CLINIC | Age: 51
End: 2021-03-17

## 2021-03-17 DIAGNOSIS — K50.80 CROHN'S COLITIS: ICD-10-CM

## 2021-03-17 PROCEDURE — 96375 TX/PRO/DX INJ NEW DRUG ADDON: CPT | Performed by: INTERNAL MEDICINE

## 2021-03-17 PROCEDURE — 96413 CHEMO IV INFUSION 1 HR: CPT | Performed by: INTERNAL MEDICINE

## 2021-03-17 PROCEDURE — 96415 CHEMO IV INFUSION ADDL HR: CPT | Performed by: INTERNAL MEDICINE

## 2021-03-17 RX ORDER — PANTOPRAZOLE SODIUM 40 MG/1
1 TABLET TABLET, DELAYED RELEASE ORAL BID
Qty: 60 TABLET | Refills: 11 | Status: ACTIVE | COMMUNITY
Start: 2021-03-10

## 2021-03-17 RX ORDER — ERGOCALCIFEROL 1.25 MG/1
CAPSULE ORAL
Qty: 0 | Refills: 0 | Status: ACTIVE | COMMUNITY
Start: 1900-01-01

## 2021-03-17 RX ORDER — ONDANSETRON HYDROCHLORIDE 4 MG/1
1 TABLET TABLET, FILM COATED ORAL
Qty: 60 | Refills: 1 | Status: ACTIVE | COMMUNITY
Start: 2021-03-10 | End: 2021-05-09

## 2021-03-17 RX ORDER — MYCOPHENOLATE MOFETIL 250 MG/1
TAKE 4 CAPSULES (1,000 MG) BY ORAL ROUTE 2 TIMES PER DAY CAPSULE ORAL 2
Qty: 0 | Refills: 0 | Status: ACTIVE | COMMUNITY
Start: 1900-01-01

## 2021-03-21 LAB
A/G RATIO: 1.1
ALBUMIN: 4
ALKALINE PHOSPHATASE: 264
ALT (SGPT): 25
ANTI-INFLIXIMAB ANTIBODY: <22
AST (SGOT): 33
BASO (ABSOLUTE): 0
BASOS: 1
BILIRUBIN, TOTAL: 0.6
BUN/CREATININE RATIO: 5
BUN: 10
CALCIUM: 10.3
CARBON DIOXIDE, TOTAL: 24
CHLORIDE: 91
CREATININE: 1.89
EGFR IF AFRICN AM: 34
EGFR IF NONAFRICN AM: 30
EOS (ABSOLUTE): 0.5
EOS: 6
GLOBULIN, TOTAL: 3.7
GLUCOSE: 600
HEMATOCRIT: 36.1
HEMATOLOGY COMMENTS:: (no result)
HEMOGLOBIN: 11.4
IMMATURE CELLS: (no result)
IMMATURE GRANS (ABS): 0
IMMATURE GRANULOCYTES: 0
INFLIXIMAB DRUG LEVEL: 9.7
LYMPHS (ABSOLUTE): 1.4
LYMPHS: 17
MCH: 29.4
MCHC: 31.6
MCV: 93
MONOCYTES(ABSOLUTE): 0.4
MONOCYTES: 5
NEUTROPHILS (ABSOLUTE): 5.6
NEUTROPHILS: 71
NRBC: (no result)
PLATELETS: 113
POTASSIUM: 4.1
PROTEIN, TOTAL: 7.7
RBC: 3.88
RDW: 12.9
SODIUM: 128
WBC: 7.9

## 2021-04-05 ENCOUNTER — OFFICE VISIT (OUTPATIENT)
Dept: URBAN - METROPOLITAN AREA SURGERY CENTER 18 | Facility: SURGERY CENTER | Age: 51
End: 2021-04-05
Payer: SELF-PAY

## 2021-04-05 ENCOUNTER — TELEPHONE ENCOUNTER (OUTPATIENT)
Dept: URBAN - METROPOLITAN AREA CLINIC 92 | Facility: CLINIC | Age: 51
End: 2021-04-05

## 2021-04-05 ENCOUNTER — CLAIMS CREATED FROM THE CLAIM WINDOW (OUTPATIENT)
Dept: URBAN - METROPOLITAN AREA CLINIC 4 | Facility: CLINIC | Age: 51
End: 2021-04-05
Payer: SELF-PAY

## 2021-04-05 DIAGNOSIS — K22.10 BARRETT'S ESOPHAGEAL ULCERATION: ICD-10-CM

## 2021-04-05 DIAGNOSIS — K21.00 GASTRO-ESOPHAGEAL REFLUX DISEASE WITH ESOPHAGITIS, WITHOUT BLEEDING: ICD-10-CM

## 2021-04-05 DIAGNOSIS — K29.40 CHRONIC ATROPHIC GASTRITIS WITHOUT BLEEDING: ICD-10-CM

## 2021-04-05 DIAGNOSIS — K29.60 ADENOPAPILLOMATOSIS GASTRICA: ICD-10-CM

## 2021-04-05 PROCEDURE — 88342 IMHCHEM/IMCYTCHM 1ST ANTB: CPT | Performed by: PATHOLOGY

## 2021-04-05 PROCEDURE — 88305 TISSUE EXAM BY PATHOLOGIST: CPT | Performed by: PATHOLOGY

## 2021-04-05 PROCEDURE — G8907 PT DOC NO EVENTS ON DISCHARG: HCPCS | Performed by: INTERNAL MEDICINE

## 2021-04-05 PROCEDURE — 88312 SPECIAL STAINS GROUP 1: CPT | Performed by: PATHOLOGY

## 2021-04-05 PROCEDURE — 43239 EGD BIOPSY SINGLE/MULTIPLE: CPT | Performed by: INTERNAL MEDICINE

## 2021-04-05 RX ORDER — NYSTATIN 100000 [USP'U]/ML
4 ML SUSPENSION ORAL
Qty: 480 | Refills: 0 | OUTPATIENT
Start: 2021-04-05 | End: 2021-05-05

## 2021-04-05 RX ORDER — ERGOCALCIFEROL 1.25 MG/1
CAPSULE ORAL
Qty: 0 | Refills: 0 | Status: ACTIVE | COMMUNITY
Start: 1900-01-01

## 2021-04-05 RX ORDER — PANTOPRAZOLE SODIUM 40 MG/1
1 TABLET TABLET, DELAYED RELEASE ORAL BID
Qty: 60 TABLET | Refills: 11 | Status: ACTIVE | COMMUNITY
Start: 2021-03-10

## 2021-04-05 RX ORDER — MYCOPHENOLATE MOFETIL 250 MG/1
TAKE 4 CAPSULES (1,000 MG) BY ORAL ROUTE 2 TIMES PER DAY CAPSULE ORAL 2
Qty: 0 | Refills: 0 | Status: ACTIVE | COMMUNITY
Start: 1900-01-01

## 2021-04-05 RX ORDER — ONDANSETRON HYDROCHLORIDE 4 MG/1
1 TABLET TABLET, FILM COATED ORAL
Qty: 60 | Refills: 1 | Status: ACTIVE | COMMUNITY
Start: 2021-03-10 | End: 2021-05-09

## 2021-04-16 ENCOUNTER — OFFICE VISIT (OUTPATIENT)
Dept: URBAN - METROPOLITAN AREA CLINIC 97 | Facility: CLINIC | Age: 51
End: 2021-04-16
Payer: SELF-PAY

## 2021-04-16 VITALS
WEIGHT: 133 LBS | HEART RATE: 98 BPM | BODY MASS INDEX: 19.7 KG/M2 | HEIGHT: 69 IN | TEMPERATURE: 97.5 F | RESPIRATION RATE: 18 BRPM | SYSTOLIC BLOOD PRESSURE: 120 MMHG | DIASTOLIC BLOOD PRESSURE: 87 MMHG

## 2021-04-16 DIAGNOSIS — K50.80 CROHN'S COLITIS: ICD-10-CM

## 2021-04-16 PROCEDURE — 96375 TX/PRO/DX INJ NEW DRUG ADDON: CPT | Performed by: INTERNAL MEDICINE

## 2021-04-16 PROCEDURE — 96413 CHEMO IV INFUSION 1 HR: CPT | Performed by: INTERNAL MEDICINE

## 2021-04-16 PROCEDURE — 96415 CHEMO IV INFUSION ADDL HR: CPT | Performed by: INTERNAL MEDICINE

## 2021-04-16 RX ORDER — ERGOCALCIFEROL 1.25 MG/1
CAPSULE ORAL
Qty: 0 | Refills: 0 | Status: ACTIVE | COMMUNITY
Start: 1900-01-01

## 2021-04-16 RX ORDER — NYSTATIN 100000 [USP'U]/ML
4 ML SUSPENSION ORAL
Qty: 480 | Refills: 0 | Status: ACTIVE | COMMUNITY
Start: 2021-04-05 | End: 2021-05-05

## 2021-04-16 RX ORDER — ONDANSETRON HYDROCHLORIDE 4 MG/1
1 TABLET TABLET, FILM COATED ORAL
Qty: 60 | Refills: 1 | Status: ACTIVE | COMMUNITY
Start: 2021-03-10 | End: 2021-05-09

## 2021-04-16 RX ORDER — MYCOPHENOLATE MOFETIL 250 MG/1
TAKE 4 CAPSULES (1,000 MG) BY ORAL ROUTE 2 TIMES PER DAY CAPSULE ORAL 2
Qty: 0 | Refills: 0 | Status: ACTIVE | COMMUNITY
Start: 1900-01-01

## 2021-04-16 RX ORDER — PANTOPRAZOLE SODIUM 40 MG/1
1 TABLET TABLET, DELAYED RELEASE ORAL BID
Qty: 60 TABLET | Refills: 11 | Status: ACTIVE | COMMUNITY
Start: 2021-03-10

## 2021-04-21 ENCOUNTER — OFFICE VISIT (OUTPATIENT)
Dept: URBAN - METROPOLITAN AREA TELEHEALTH 2 | Facility: TELEHEALTH | Age: 51
End: 2021-04-21
Payer: SELF-PAY

## 2021-04-21 DIAGNOSIS — Z79.899 LONG-TERM USE OF IMMUNOSUPPRESSANT MEDICATION: ICD-10-CM

## 2021-04-21 DIAGNOSIS — K21.9 GERD (GASTROESOPHAGEAL REFLUX DISEASE): ICD-10-CM

## 2021-04-21 DIAGNOSIS — K50.80 CROHN'S DISEASE OF BOTH SMALL AND LARGE INTESTINE WITHOUT COMPLICATION: ICD-10-CM

## 2021-04-21 DIAGNOSIS — R19.7 DIARRHEA: ICD-10-CM

## 2021-04-21 DIAGNOSIS — Z94.4 LIVER TRANSPLANT: ICD-10-CM

## 2021-04-21 PROBLEM — 40739000 DYSPHAGIA: Status: ACTIVE | Noted: 2021-03-10

## 2021-04-21 PROCEDURE — 99215 OFFICE O/P EST HI 40 MIN: CPT | Performed by: INTERNAL MEDICINE

## 2021-04-21 RX ORDER — PANTOPRAZOLE SODIUM 40 MG/1
1 TABLET TABLET, DELAYED RELEASE ORAL BID
Qty: 60 TABLET | Refills: 11 | OUTPATIENT

## 2021-04-21 RX ORDER — ERGOCALCIFEROL 1.25 MG/1
CAPSULE ORAL
Qty: 0 | Refills: 0 | Status: ACTIVE | COMMUNITY
Start: 1900-01-01

## 2021-04-21 RX ORDER — MYCOPHENOLATE MOFETIL 250 MG/1
TAKE 4 CAPSULES (1,000 MG) BY ORAL ROUTE 2 TIMES PER DAY CAPSULE ORAL 2
Qty: 0 | Refills: 0 | Status: ACTIVE | COMMUNITY
Start: 1900-01-01

## 2021-04-21 RX ORDER — ONDANSETRON HYDROCHLORIDE 4 MG/1
1 TABLET TABLET, FILM COATED ORAL
Qty: 60 | Refills: 1 | OUTPATIENT

## 2021-04-21 RX ORDER — ONDANSETRON HYDROCHLORIDE 4 MG/1
1 TABLET TABLET, FILM COATED ORAL
Qty: 60 | Refills: 1 | Status: ACTIVE | COMMUNITY
Start: 2021-03-10 | End: 2021-05-09

## 2021-04-21 RX ORDER — NYSTATIN 100000 [USP'U]/ML
4 ML SUSPENSION ORAL
Qty: 480 | Refills: 0 | Status: ACTIVE | COMMUNITY
Start: 2021-04-05 | End: 2021-05-05

## 2021-04-21 RX ORDER — PANTOPRAZOLE SODIUM 40 MG/1
1 TABLET TABLET, DELAYED RELEASE ORAL BID
Qty: 60 TABLET | Refills: 11 | Status: ACTIVE | COMMUNITY
Start: 2021-03-10

## 2021-04-21 RX ORDER — FAMOTIDINE 40 MG/1
1 TAB TABLET, FILM COATED ORAL BID
Qty: 60 TABLET | Refills: 11 | OUTPATIENT
Start: 2021-04-21

## 2021-04-21 RX ORDER — SUCRALFATE 1 G/1
1 TABLET ON AN EMPTY STOMACH TABLET ORAL TWICE A DAY
Qty: 60 TABLET | Refills: 4 | OUTPATIENT
End: 2021-09-18

## 2021-04-21 NOTE — HPI-TODAY'S VISIT:
Pt Sonali is a 52 y/o indiv with ileal CD, currently on Remicade (every 4 weeks 5mg/kg), here for refractory disease. . Pt referred by Dr. Felder. . Pt was initially diagnosed with UC (in [place]) and then CD.  She has colectomy 1983 (9th grade).  She has liver transplant 2011 (on MMF and tacro).  She has not been on a lot of meds for her CD since her liver transplant.  She has also been on Remicade in the past and did really well with this, but it was not continued after liver transplant due to concerns about whether it would be ok after transplant. After starting Entyvio (around 5/2019) she felt really well.  Previously on 10/24/2019, pt reports that she has a lot of diarrhea and has been experiencing a lot of dehydration.  She has had a w/u of diarrhea (nl pancreatic elastase).  She has been adjusting her entyvio from every 6 weeks then to every 4 weeks.  She is on prednisone 5mg (tapered from dose of 40mg).  She has to empty out her ostomy about 4 times per day.  She has abdominal pain at the site of her stoma and gets very dehydrated.  She subjectively has noted that adjusting the dose of entyvio has improved somewhat.  Has lost some weight (after illness)  Previously, pt reports that she continues to have lot of diarrhea, lot of breakthrough sxs within week after her infusion.  Still on steroids.  Getting dehydrated;  PPI was not approved by insurance. Previously on 1/27/2020, pt reports that she has been feeling ill still after being discharged from hospital last week. Lot of diarrhea, lot of abdominal pain. . Previously on 3/9/2020, pt reports that after her first infusion of Remicade (without loading), she felt great.  She had some breakthrough sxs about 4 weeks after the remicade infusion.  She has tapered off of prednisone.  After getting her Remicade, her sxs improved. . Previously on 12/15/2020, pt reports that her OLTx team has increased her MMF dose (to help with the AB).  She continues to have nausea with only 1 meal per day; she has had difficulty gaining weight.  There is not blood in the stool, but has diarrhea still.  Was given 2 iron infusions for her anemia. . Previously on 3/10/2021, pt reports that she is now on imuran (no longer on cellept).  Seeing improvement in terms of her GI sxs, however, she is having a sensation that food is getting stuck in her esophagus and in the mid-abd area.  She also has pain in her stoma as well and has to relieve the distention by massage. She has mild nausea. . Today on 4/21/2021, pt reports that she is having severe reflux sxs and severe, causing her to have right sided abdominal pain, worse when she eats.  EGD on 4/2021 revealed severe esophagitis.   Had to get  . 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 . 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 5/2019: quant gold neg, Hep B neg; Hep C pos . 4/2021: EGD: The examined duodenum was normal. Findings: : Normal Diffuse mildly erythematous mucosa without bleeding was found in the entire examined stomach. Biopsies were taken with a cold forceps for histology. The pathology specimen was placed into Bottle Number 1. Esophagitis with no bleeding was found. Biopsies were taken with a cold forceps for histology. The pathology specimen was placed into Bottle Number 2. . A) Stomach, Biopsy: CHRONIC INACTIVE GASTRITIS WITH HISTOLOGICAL CHANGES SUGGESTIVE OF TREATED H. PYLORI GASTRITIS. See Comment. No Evidence of H. Pylori Organisms or Intestinal Metaplasia. Negative For Dysplasia or Malignancy. COMMENT: The biopsy shows band-like superficial and interstitial deeper lymphoplasmacytic infiltrate and variable presence of some lymphoid follicles, without acute inflammation. No Helicobacter organisms are identified on Meghann and special stains. The histologic findings may represent past H. pylori gastritis treated with antibiotics and/or proton-pump inhibitors. Non-invasive test (such as urea breath test, fecal antigen detection or serum antibody) might be helpful to completely exclude H. pylori infection if clinically indicated. (B) Esophagus, Biopsy: SQUAMOUS MUCOSA WITH FOCAL ACUTE EROSION ARISING IN A BACKGROUND OF SEVERE REFLUX-TYPE CHANGES, CONSISTENT WITH REFLUX- ASSOCIATED EROSIVE ESOPHAGITIS. No Evidence of Graham's Esophagus or Eosinophilic Esophagitis. Negative for Infectious organisms, Dysplasia or Malignancy. . 1/30/2020: Colonoscopy via stoma The scope was advanced 40cm from the ostomy entry site. Patchy inflammation, moderate in severity and characterized by erosions, erythema, friability and granularity was found in the proximal ileum.  Biopsies were taken with a cold forceps for histology.  The pathology specimen was placed into Bottle Number 1. The proximal ileum appeared normal. Diffuse inflammation characterized by erosions, erythema, friability and granularity was found in the distal ileum. Biopsies were taken with a cold forceps for histology.  The pathology specimen was placed into Bottle Number 2. Overall, the proximal mucosa was normal.  The disease appears to be concentrated distally with area of normal mucosa proximal to this and then a skip lesions proximally (as per above) and then normal proxima to this.  A. Proximal Terminal I leum , Biopsy: -Moderate chronic active enteritis (see comment) -No granulomas, dysplasia, or malignancy. B. Terminal I leum , Biopsy: -Moderate chronic active enteritis (see comment) -No granulomas, dysplasia, or malignancy. . 4/2019: Colonoscopy A. The findings are consistent with history of Crohn's disease. However, the findings are not specific. The  differential diagnosis also includes enteritis associated with NSAIDs or other medication, mucosal trauma. prolapse, or infection. No viral inclusions are seen. As always, primary inflammatory bowel disease is a  diagnosis of exclusion, other possible etiologies need to be clinically excluded.  . A. Smal l Bowel , Biopsy: -Acute ileitis with erosion, consistent with clinical history of Crohn's, see comment. -No granulomas, negative for dysplasia or malignancy. . 8/2016: mild active ileitis .

## 2021-04-28 ENCOUNTER — LAB OUTSIDE AN ENCOUNTER (OUTPATIENT)
Dept: URBAN - METROPOLITAN AREA TELEHEALTH 2 | Facility: TELEHEALTH | Age: 51
End: 2021-04-28

## 2021-05-14 ENCOUNTER — OFFICE VISIT (OUTPATIENT)
Dept: URBAN - METROPOLITAN AREA CLINIC 97 | Facility: CLINIC | Age: 51
End: 2021-05-14
Payer: SELF-PAY

## 2021-05-14 DIAGNOSIS — K50.80 CROHN'S COLITIS: ICD-10-CM

## 2021-05-14 PROCEDURE — 96375 TX/PRO/DX INJ NEW DRUG ADDON: CPT | Performed by: INTERNAL MEDICINE

## 2021-05-14 PROCEDURE — 96413 CHEMO IV INFUSION 1 HR: CPT | Performed by: INTERNAL MEDICINE

## 2021-05-14 PROCEDURE — 96415 CHEMO IV INFUSION ADDL HR: CPT | Performed by: INTERNAL MEDICINE

## 2021-05-14 RX ORDER — ERGOCALCIFEROL 1.25 MG/1
CAPSULE ORAL
Qty: 0 | Refills: 0 | Status: ACTIVE | COMMUNITY
Start: 1900-01-01

## 2021-05-14 RX ORDER — MYCOPHENOLATE MOFETIL 250 MG/1
TAKE 4 CAPSULES (1,000 MG) BY ORAL ROUTE 2 TIMES PER DAY CAPSULE ORAL 2
Qty: 0 | Refills: 0 | Status: ACTIVE | COMMUNITY
Start: 1900-01-01

## 2021-05-14 RX ORDER — FAMOTIDINE 40 MG/1
1 TAB TABLET, FILM COATED ORAL BID
Qty: 60 TABLET | Refills: 11 | Status: ACTIVE | COMMUNITY
Start: 2021-04-21

## 2021-05-14 RX ORDER — ONDANSETRON HYDROCHLORIDE 4 MG/1
1 TABLET TABLET, FILM COATED ORAL
Qty: 60 | Refills: 1 | Status: ACTIVE | COMMUNITY

## 2021-05-14 RX ORDER — SUCRALFATE 1 G/1
1 TABLET ON AN EMPTY STOMACH TABLET ORAL TWICE A DAY
Qty: 60 TABLET | Refills: 4 | Status: ACTIVE | COMMUNITY
End: 2021-09-18

## 2021-05-14 RX ORDER — PANTOPRAZOLE SODIUM 40 MG/1
1 TABLET TABLET, DELAYED RELEASE ORAL BID
Qty: 60 TABLET | Refills: 11 | Status: ACTIVE | COMMUNITY

## 2021-06-01 ENCOUNTER — ERX REFILL RESPONSE (OUTPATIENT)
Dept: URBAN - METROPOLITAN AREA CLINIC 98 | Facility: CLINIC | Age: 51
End: 2021-06-01

## 2021-06-01 RX ORDER — ONDANSETRON 4 MG/1
DISSOLVE ONE TABLET BY MOUTH EVERY 6 HOURS AS NEEDED FOR NAUSEA AND VOMITING TABLET, ORALLY DISINTEGRATING ORAL
Qty: 60 | Refills: 0

## 2021-06-11 ENCOUNTER — OFFICE VISIT (OUTPATIENT)
Dept: URBAN - METROPOLITAN AREA CLINIC 97 | Facility: CLINIC | Age: 51
End: 2021-06-11
Payer: SELF-PAY

## 2021-06-11 ENCOUNTER — TELEPHONE ENCOUNTER (OUTPATIENT)
Dept: URBAN - METROPOLITAN AREA CLINIC 97 | Facility: CLINIC | Age: 51
End: 2021-06-11

## 2021-06-11 VITALS
HEART RATE: 107 BPM | WEIGHT: 133 LBS | HEIGHT: 69 IN | SYSTOLIC BLOOD PRESSURE: 121 MMHG | BODY MASS INDEX: 19.7 KG/M2 | DIASTOLIC BLOOD PRESSURE: 80 MMHG | RESPIRATION RATE: 17 BRPM | TEMPERATURE: 96.9 F

## 2021-06-11 DIAGNOSIS — K50.80 CROHN'S COLITIS: ICD-10-CM

## 2021-06-11 PROCEDURE — 96415 CHEMO IV INFUSION ADDL HR: CPT | Performed by: INTERNAL MEDICINE

## 2021-06-11 PROCEDURE — 96375 TX/PRO/DX INJ NEW DRUG ADDON: CPT | Performed by: INTERNAL MEDICINE

## 2021-06-11 PROCEDURE — 96413 CHEMO IV INFUSION 1 HR: CPT | Performed by: INTERNAL MEDICINE

## 2021-06-11 RX ORDER — PANTOPRAZOLE SODIUM 40 MG/1
1 TABLET TABLET, DELAYED RELEASE ORAL BID
Qty: 60 TABLET | Refills: 11 | Status: ACTIVE | COMMUNITY

## 2021-06-11 RX ORDER — ERGOCALCIFEROL 1.25 MG/1
CAPSULE ORAL
Qty: 0 | Refills: 0 | Status: ACTIVE | COMMUNITY
Start: 1900-01-01

## 2021-06-11 RX ORDER — ONDANSETRON HYDROCHLORIDE 4 MG/1
1 TABLET TABLET, FILM COATED ORAL
Qty: 60 | Refills: 1 | Status: ACTIVE | COMMUNITY

## 2021-06-11 RX ORDER — MYCOPHENOLATE MOFETIL 250 MG/1
TAKE 4 CAPSULES (1,000 MG) BY ORAL ROUTE 2 TIMES PER DAY CAPSULE ORAL 2
Qty: 0 | Refills: 0 | Status: ACTIVE | COMMUNITY
Start: 1900-01-01

## 2021-06-11 RX ORDER — FAMOTIDINE 40 MG/1
1 TAB TABLET, FILM COATED ORAL BID
Qty: 60 TABLET | Refills: 11 | Status: ACTIVE | COMMUNITY
Start: 2021-04-21

## 2021-06-11 RX ORDER — SUCRALFATE 1 G/1
1 TABLET ON AN EMPTY STOMACH TABLET ORAL TWICE A DAY
Qty: 60 TABLET | Refills: 4 | Status: ACTIVE | COMMUNITY
End: 2021-09-18

## 2021-06-11 RX ORDER — ONDANSETRON 4 MG/1
DISSOLVE ONE TABLET BY MOUTH EVERY 6 HOURS AS NEEDED FOR NAUSEA AND VOMITING TABLET, ORALLY DISINTEGRATING ORAL
Qty: 60 | Refills: 0 | Status: ACTIVE | COMMUNITY

## 2021-07-09 ENCOUNTER — OFFICE VISIT (OUTPATIENT)
Dept: URBAN - METROPOLITAN AREA CLINIC 97 | Facility: CLINIC | Age: 51
End: 2021-07-09
Payer: SELF-PAY

## 2021-07-09 DIAGNOSIS — K50.80 CROHN'S COLITIS: ICD-10-CM

## 2021-07-09 PROCEDURE — 96375 TX/PRO/DX INJ NEW DRUG ADDON: CPT | Performed by: INTERNAL MEDICINE

## 2021-07-09 PROCEDURE — 96415 CHEMO IV INFUSION ADDL HR: CPT | Performed by: INTERNAL MEDICINE

## 2021-07-09 PROCEDURE — 96413 CHEMO IV INFUSION 1 HR: CPT | Performed by: INTERNAL MEDICINE

## 2021-07-09 RX ORDER — ONDANSETRON HYDROCHLORIDE 4 MG/1
1 TABLET TABLET, FILM COATED ORAL
Qty: 60 | Refills: 1 | Status: ACTIVE | COMMUNITY

## 2021-07-09 RX ORDER — MYCOPHENOLATE MOFETIL 250 MG/1
TAKE 4 CAPSULES (1,000 MG) BY ORAL ROUTE 2 TIMES PER DAY CAPSULE ORAL 2
Qty: 0 | Refills: 0 | Status: ACTIVE | COMMUNITY
Start: 1900-01-01

## 2021-07-09 RX ORDER — PANTOPRAZOLE SODIUM 40 MG/1
1 TABLET TABLET, DELAYED RELEASE ORAL BID
Qty: 60 TABLET | Refills: 11 | Status: ACTIVE | COMMUNITY

## 2021-07-09 RX ORDER — FAMOTIDINE 40 MG/1
1 TAB TABLET, FILM COATED ORAL BID
Qty: 60 TABLET | Refills: 11 | Status: ACTIVE | COMMUNITY
Start: 2021-04-21

## 2021-07-09 RX ORDER — ERGOCALCIFEROL 1.25 MG/1
CAPSULE ORAL
Qty: 0 | Refills: 0 | Status: ACTIVE | COMMUNITY
Start: 1900-01-01

## 2021-07-09 RX ORDER — SUCRALFATE 1 G/1
1 TABLET ON AN EMPTY STOMACH TABLET ORAL TWICE A DAY
Qty: 60 TABLET | Refills: 4 | Status: ACTIVE | COMMUNITY
End: 2021-09-18

## 2021-07-09 RX ORDER — ONDANSETRON 4 MG/1
DISSOLVE ONE TABLET BY MOUTH EVERY 6 HOURS AS NEEDED FOR NAUSEA AND VOMITING TABLET, ORALLY DISINTEGRATING ORAL
Qty: 60 | Refills: 0 | Status: ACTIVE | COMMUNITY

## 2021-07-28 ENCOUNTER — OFFICE VISIT (OUTPATIENT)
Dept: URBAN - METROPOLITAN AREA CLINIC 98 | Facility: CLINIC | Age: 51
End: 2021-07-28
Payer: SELF-PAY

## 2021-07-28 ENCOUNTER — LAB OUTSIDE AN ENCOUNTER (OUTPATIENT)
Dept: URBAN - METROPOLITAN AREA CLINIC 98 | Facility: CLINIC | Age: 51
End: 2021-07-28

## 2021-07-28 VITALS
HEART RATE: 112 BPM | WEIGHT: 133.4 LBS | HEIGHT: 69 IN | BODY MASS INDEX: 19.76 KG/M2 | SYSTOLIC BLOOD PRESSURE: 121 MMHG | DIASTOLIC BLOOD PRESSURE: 79 MMHG | TEMPERATURE: 98.2 F

## 2021-07-28 DIAGNOSIS — K21.9 GERD (GASTROESOPHAGEAL REFLUX DISEASE): ICD-10-CM

## 2021-07-28 DIAGNOSIS — Z79.899 LONG-TERM USE OF IMMUNOSUPPRESSANT MEDICATION: ICD-10-CM

## 2021-07-28 DIAGNOSIS — Z94.4 LIVER TRANSPLANT: ICD-10-CM

## 2021-07-28 DIAGNOSIS — K50.80 CROHN'S DISEASE OF BOTH SMALL AND LARGE INTESTINE WITHOUT COMPLICATION: ICD-10-CM

## 2021-07-28 DIAGNOSIS — R19.7 DIARRHEA: ICD-10-CM

## 2021-07-28 PROCEDURE — 99215 OFFICE O/P EST HI 40 MIN: CPT | Performed by: INTERNAL MEDICINE

## 2021-07-28 RX ORDER — MYCOPHENOLATE MOFETIL 250 MG/1
TAKE 4 CAPSULES (1,000 MG) BY ORAL ROUTE 2 TIMES PER DAY CAPSULE ORAL 2
Qty: 0 | Refills: 0 | COMMUNITY
Start: 1900-01-01

## 2021-07-28 RX ORDER — PANTOPRAZOLE SODIUM 40 MG/1
1 TABLET TABLET, DELAYED RELEASE ORAL BID
Qty: 60 TABLET | Refills: 11 | OUTPATIENT

## 2021-07-28 RX ORDER — FAMOTIDINE 40 MG/1
1 TAB TABLET, FILM COATED ORAL BID
Qty: 60 TABLET | Refills: 11 | COMMUNITY
Start: 2021-04-21

## 2021-07-28 RX ORDER — SUCRALFATE 1 G/1
1 TABLET ON AN EMPTY STOMACH TABLET ORAL TWICE A DAY
Qty: 60 TABLET | Refills: 4 | OUTPATIENT

## 2021-07-28 RX ORDER — ONDANSETRON HYDROCHLORIDE 4 MG/1
1 TABLET TABLET, FILM COATED ORAL
Qty: 60 | Refills: 1 | COMMUNITY

## 2021-07-28 RX ORDER — PANTOPRAZOLE SODIUM 40 MG/1
1 TABLET TABLET, DELAYED RELEASE ORAL BID
Qty: 60 TABLET | Refills: 11 | COMMUNITY

## 2021-07-28 RX ORDER — ONDANSETRON 4 MG/1
DISSOLVE ONE TABLET BY MOUTH EVERY 6 HOURS AS NEEDED FOR NAUSEA AND VOMITING TABLET, ORALLY DISINTEGRATING ORAL
Qty: 60 | Refills: 0 | COMMUNITY

## 2021-07-28 RX ORDER — ONDANSETRON HYDROCHLORIDE 4 MG/1
1 TABLET TABLET, FILM COATED ORAL
Qty: 60 | Refills: 1 | OUTPATIENT

## 2021-07-28 RX ORDER — FAMOTIDINE 40 MG/1
1 TAB TABLET, FILM COATED ORAL BID
Qty: 60 TABLET | Refills: 11 | OUTPATIENT

## 2021-07-28 RX ORDER — SUCRALFATE 1 G/1
1 TABLET ON AN EMPTY STOMACH TABLET ORAL TWICE A DAY
Qty: 60 TABLET | Refills: 4 | COMMUNITY
End: 2021-09-18

## 2021-07-28 RX ORDER — ERGOCALCIFEROL 1.25 MG/1
CAPSULE ORAL
Qty: 0 | Refills: 0 | COMMUNITY
Start: 1900-01-01

## 2021-07-28 NOTE — HPI-TODAY'S VISIT:
Pt Sonali is a 52 y/o indiv with ileal CD, currently on Remicade (every 4 weeks 5mg/kg), here for refractory disease. . Pt referred by Dr. Felder. . Pt was initially diagnosed with UC (in [place]) and then CD.  She has colectomy 1983 (9th grade).  She has liver transplant 2011 (on MMF and tacro).  She has not been on a lot of meds for her CD since her liver transplant.  She has also been on Remicade in the past and did really well with this, but it was not continued after liver transplant due to concerns about whether it would be ok after transplant. After starting Entyvio (around 5/2019) she felt really well.  Previously on 10/24/2019, pt reports that she has a lot of diarrhea and has been experiencing a lot of dehydration.  She has had a w/u of diarrhea (nl pancreatic elastase).  She has been adjusting her entyvio from every 6 weeks then to every 4 weeks.  She is on prednisone 5mg (tapered from dose of 40mg).  She has to empty out her ostomy about 4 times per day.  She has abdominal pain at the site of her stoma and gets very dehydrated.  She subjectively has noted that adjusting the dose of entyvio has improved somewhat.  Has lost some weight (after illness)  Previously, pt reports that she continues to have lot of diarrhea, lot of breakthrough sxs within week after her infusion.  Still on steroids.  Getting dehydrated;  PPI was not approved by insurance. Previously on 1/27/2020, pt reports that she has been feeling ill still after being discharged from hospital last week. Lot of diarrhea, lot of abdominal pain. . Previously on 3/9/2020, pt reports that after her first infusion of Remicade (without loading), she felt great.  She had some breakthrough sxs about 4 weeks after the remicade infusion.  She has tapered off of prednisone.  After getting her Remicade, her sxs improved. . Previously on 12/15/2020, pt reports that her OLTx team has increased her MMF dose (to help with the AB).  She continues to have nausea with only 1 meal per day; she has had difficulty gaining weight.  There is not blood in the stool, but has diarrhea still.  Was given 2 iron infusions for her anemia. . Previously on 3/10/2021, pt reports that she is now on imuran (no longer on cellept).  Seeing improvement in terms of her GI sxs, however, she is having a sensation that food is getting stuck in her esophagus and in the mid-abd area.  She also has pain in her stoma as well and has to relieve the distention by massage. She has mild nausea. . Previously on 4/21/2021, pt reports that she is having severe reflux sxs and severe, causing her to have right sided abdominal pain, worse when she eats.  EGD on 4/2021 revealed severe esophagitis.   Had to get  . Today on 7/28/2021, pt reports that she had a blood transfusion performed at [place] ([facility]). She also saw hematologist to get iron infusion.  No blood in the stool, she has up to 8-10 BM per day, filling up her pouch.  Having a lot of gastric irritation/reflux sxs.    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 . 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 5/2019: quant gold neg, Hep B neg; Hep C pos . 4/2021: EGD: The examined duodenum was normal. Findings: : Normal Diffuse mildly erythematous mucosa without bleeding was found in the entire examined stomach. Biopsies were taken with a cold forceps for histology. The pathology specimen was placed into Bottle Number 1. Esophagitis with no bleeding was found. Biopsies were taken with a cold forceps for histology. The pathology specimen was placed into Bottle Number 2. . A) Stomach, Biopsy: CHRONIC INACTIVE GASTRITIS WITH HISTOLOGICAL CHANGES SUGGESTIVE OF TREATED H. PYLORI GASTRITIS. See Comment. No Evidence of H. Pylori Organisms or Intestinal Metaplasia. Negative For Dysplasia or Malignancy. COMMENT: The biopsy shows band-like superficial and interstitial deeper lymphoplasmacytic infiltrate and variable presence of some lymphoid follicles, without acute inflammation. No Helicobacter organisms are identified on Meghann and special stains. The histologic findings may represent past H. pylori gastritis treated with antibiotics and/or proton-pump inhibitors. Non-invasive test (such as urea breath test, fecal antigen detection or serum antibody) might be helpful to completely exclude H. pylori infection if clinically indicated. (B) Esophagus, Biopsy: SQUAMOUS MUCOSA WITH FOCAL ACUTE EROSION ARISING IN A BACKGROUND OF SEVERE REFLUX-TYPE CHANGES, CONSISTENT WITH REFLUX- ASSOCIATED EROSIVE ESOPHAGITIS. No Evidence of Graham's Esophagus or Eosinophilic Esophagitis. Negative for Infectious organisms, Dysplasia or Malignancy. . 1/30/2020: Colonoscopy via stoma The scope was advanced 40cm from the ostomy entry site. Patchy inflammation, moderate in severity and characterized by erosions, erythema, friability and granularity was found in the proximal ileum.  Biopsies were taken with a cold forceps for histology.  The pathology specimen was placed into Bottle Number 1. The proximal ileum appeared normal. Diffuse inflammation characterized by erosions, erythema, friability and granularity was found in the distal ileum. Biopsies were taken with a cold forceps for histology.  The pathology specimen was placed into Bottle Number 2. Overall, the proximal mucosa was normal.  The disease appears to be concentrated distally with area of normal mucosa proximal to this and then a skip lesions proximally (as per above) and then normal proxima to this.  A. Proximal Terminal I leum , Biopsy: -Moderate chronic active enteritis (see comment) -No granulomas, dysplasia, or malignancy. B. Terminal I leum , Biopsy: -Moderate chronic active enteritis (see comment) -No granulomas, dysplasia, or malignancy. . 4/2019: Colonoscopy A. The findings are consistent with history of Crohn's disease. However, the findings are not specific. The  differential diagnosis also includes enteritis associated with NSAIDs or other medication, mucosal trauma. prolapse, or infection. No viral inclusions are seen. As always, primary inflammatory bowel disease is a  diagnosis of exclusion, other possible etiologies need to be clinically excluded.  . A. Smal l Bowel , Biopsy: -Acute ileitis with erosion, consistent with clinical history of Crohn's, see comment. -No granulomas, negative for dysplasia or malignancy. . 8/2016: mild active ileitis .

## 2021-08-04 ENCOUNTER — LAB OUTSIDE AN ENCOUNTER (OUTPATIENT)
Dept: URBAN - METROPOLITAN AREA CLINIC 98 | Facility: CLINIC | Age: 51
End: 2021-08-04

## 2021-08-06 ENCOUNTER — OFFICE VISIT (OUTPATIENT)
Dept: URBAN - METROPOLITAN AREA CLINIC 97 | Facility: CLINIC | Age: 51
End: 2021-08-06
Payer: SELF-PAY

## 2021-08-06 VITALS
SYSTOLIC BLOOD PRESSURE: 120 MMHG | WEIGHT: 134 LBS | HEIGHT: 69 IN | BODY MASS INDEX: 19.85 KG/M2 | HEART RATE: 96 BPM | RESPIRATION RATE: 18 BRPM | TEMPERATURE: 97 F | DIASTOLIC BLOOD PRESSURE: 86 MMHG

## 2021-08-06 DIAGNOSIS — K50.80 CROHN'S COLITIS: ICD-10-CM

## 2021-08-06 PROCEDURE — 96413 CHEMO IV INFUSION 1 HR: CPT | Performed by: INTERNAL MEDICINE

## 2021-08-06 PROCEDURE — 96415 CHEMO IV INFUSION ADDL HR: CPT | Performed by: INTERNAL MEDICINE

## 2021-08-06 PROCEDURE — 96375 TX/PRO/DX INJ NEW DRUG ADDON: CPT | Performed by: INTERNAL MEDICINE

## 2021-08-06 RX ORDER — SUCRALFATE 1 G/1
1 TABLET ON AN EMPTY STOMACH TABLET ORAL TWICE A DAY
Qty: 60 TABLET | Refills: 4 | Status: ACTIVE | COMMUNITY

## 2021-08-06 RX ORDER — ONDANSETRON 4 MG/1
DISSOLVE ONE TABLET BY MOUTH EVERY 6 HOURS AS NEEDED FOR NAUSEA AND VOMITING TABLET, ORALLY DISINTEGRATING ORAL
Qty: 60 | Refills: 0 | COMMUNITY

## 2021-08-06 RX ORDER — ERGOCALCIFEROL 1.25 MG/1
CAPSULE ORAL
Qty: 0 | Refills: 0 | COMMUNITY
Start: 1900-01-01

## 2021-08-06 RX ORDER — MYCOPHENOLATE MOFETIL 250 MG/1
TAKE 4 CAPSULES (1,000 MG) BY ORAL ROUTE 2 TIMES PER DAY CAPSULE ORAL 2
Qty: 0 | Refills: 0 | COMMUNITY
Start: 1900-01-01

## 2021-08-06 RX ORDER — PANTOPRAZOLE SODIUM 40 MG/1
1 TABLET TABLET, DELAYED RELEASE ORAL BID
Qty: 60 TABLET | Refills: 11 | Status: ACTIVE | COMMUNITY

## 2021-08-06 RX ORDER — ONDANSETRON HYDROCHLORIDE 4 MG/1
1 TABLET TABLET, FILM COATED ORAL
Qty: 60 | Refills: 1 | Status: ACTIVE | COMMUNITY

## 2021-08-06 RX ORDER — FAMOTIDINE 40 MG/1
1 TAB TABLET, FILM COATED ORAL BID
Qty: 60 TABLET | Refills: 11 | Status: ACTIVE | COMMUNITY

## 2021-08-19 ENCOUNTER — OFFICE VISIT (OUTPATIENT)
Dept: URBAN - METROPOLITAN AREA CLINIC 97 | Facility: CLINIC | Age: 51
End: 2021-08-19
Payer: SELF-PAY

## 2021-08-19 VITALS
HEIGHT: 69 IN | HEART RATE: 89 BPM | SYSTOLIC BLOOD PRESSURE: 123 MMHG | DIASTOLIC BLOOD PRESSURE: 84 MMHG | BODY MASS INDEX: 20.59 KG/M2 | TEMPERATURE: 97.5 F | WEIGHT: 139 LBS | RESPIRATION RATE: 18 BRPM

## 2021-08-19 DIAGNOSIS — K50.80 CROHN'S COLITIS: ICD-10-CM

## 2021-08-19 PROCEDURE — 96413 CHEMO IV INFUSION 1 HR: CPT | Performed by: INTERNAL MEDICINE

## 2021-08-19 PROCEDURE — 96375 TX/PRO/DX INJ NEW DRUG ADDON: CPT | Performed by: INTERNAL MEDICINE

## 2021-08-19 PROCEDURE — 96415 CHEMO IV INFUSION ADDL HR: CPT | Performed by: INTERNAL MEDICINE

## 2021-08-19 RX ORDER — ERGOCALCIFEROL 1.25 MG/1
CAPSULE ORAL
Qty: 0 | Refills: 0 | COMMUNITY
Start: 1900-01-01

## 2021-08-19 RX ORDER — PANTOPRAZOLE SODIUM 40 MG/1
1 TABLET TABLET, DELAYED RELEASE ORAL BID
Qty: 60 TABLET | Refills: 11 | Status: ACTIVE | COMMUNITY

## 2021-08-19 RX ORDER — ONDANSETRON 4 MG/1
DISSOLVE ONE TABLET BY MOUTH EVERY 6 HOURS AS NEEDED FOR NAUSEA AND VOMITING TABLET, ORALLY DISINTEGRATING ORAL
Qty: 60 | Refills: 0 | COMMUNITY

## 2021-08-19 RX ORDER — MYCOPHENOLATE MOFETIL 250 MG/1
TAKE 4 CAPSULES (1,000 MG) BY ORAL ROUTE 2 TIMES PER DAY CAPSULE ORAL 2
Qty: 0 | Refills: 0 | COMMUNITY
Start: 1900-01-01

## 2021-08-19 RX ORDER — ONDANSETRON HYDROCHLORIDE 4 MG/1
1 TABLET TABLET, FILM COATED ORAL
Qty: 60 | Refills: 1 | Status: ACTIVE | COMMUNITY

## 2021-08-19 RX ORDER — SUCRALFATE 1 G/1
1 TABLET ON AN EMPTY STOMACH TABLET ORAL TWICE A DAY
Qty: 60 TABLET | Refills: 4 | Status: ACTIVE | COMMUNITY

## 2021-08-19 RX ORDER — FAMOTIDINE 40 MG/1
1 TAB TABLET, FILM COATED ORAL BID
Qty: 60 TABLET | Refills: 11 | Status: ACTIVE | COMMUNITY

## 2021-08-20 ENCOUNTER — TELEPHONE ENCOUNTER (OUTPATIENT)
Dept: URBAN - METROPOLITAN AREA CLINIC 82 | Facility: CLINIC | Age: 51
End: 2021-08-20

## 2021-09-14 ENCOUNTER — OFFICE VISIT (OUTPATIENT)
Dept: URBAN - METROPOLITAN AREA CLINIC 97 | Facility: CLINIC | Age: 51
End: 2021-09-14
Payer: SELF-PAY

## 2021-09-14 VITALS
TEMPERATURE: 97.3 F | HEART RATE: 111 BPM | HEIGHT: 69 IN | SYSTOLIC BLOOD PRESSURE: 120 MMHG | DIASTOLIC BLOOD PRESSURE: 84 MMHG | WEIGHT: 138 LBS | BODY MASS INDEX: 20.44 KG/M2 | RESPIRATION RATE: 18 BRPM

## 2021-09-14 DIAGNOSIS — K50.80 CROHN'S COLITIS: ICD-10-CM

## 2021-09-14 PROCEDURE — 96413 CHEMO IV INFUSION 1 HR: CPT | Performed by: INTERNAL MEDICINE

## 2021-09-14 PROCEDURE — 96375 TX/PRO/DX INJ NEW DRUG ADDON: CPT | Performed by: INTERNAL MEDICINE

## 2021-09-14 PROCEDURE — 96415 CHEMO IV INFUSION ADDL HR: CPT | Performed by: INTERNAL MEDICINE

## 2021-09-14 RX ORDER — SUCRALFATE 1 G/1
1 TABLET ON AN EMPTY STOMACH TABLET ORAL TWICE A DAY
Qty: 60 TABLET | Refills: 4 | Status: ACTIVE | COMMUNITY

## 2021-09-14 RX ORDER — PANTOPRAZOLE SODIUM 40 MG/1
1 TABLET TABLET, DELAYED RELEASE ORAL BID
Qty: 60 TABLET | Refills: 11 | Status: ACTIVE | COMMUNITY

## 2021-09-14 RX ORDER — MYCOPHENOLATE MOFETIL 250 MG/1
TAKE 4 CAPSULES (1,000 MG) BY ORAL ROUTE 2 TIMES PER DAY CAPSULE ORAL 2
Qty: 0 | Refills: 0 | COMMUNITY
Start: 1900-01-01

## 2021-09-14 RX ORDER — ONDANSETRON 4 MG/1
DISSOLVE ONE TABLET BY MOUTH EVERY 6 HOURS AS NEEDED FOR NAUSEA AND VOMITING TABLET, ORALLY DISINTEGRATING ORAL
Qty: 60 | Refills: 0 | COMMUNITY

## 2021-09-14 RX ORDER — FAMOTIDINE 40 MG/1
1 TAB TABLET, FILM COATED ORAL BID
Qty: 60 TABLET | Refills: 11 | Status: ACTIVE | COMMUNITY

## 2021-09-14 RX ORDER — ERGOCALCIFEROL 1.25 MG/1
CAPSULE ORAL
Qty: 0 | Refills: 0 | COMMUNITY
Start: 1900-01-01

## 2021-09-14 RX ORDER — ONDANSETRON HYDROCHLORIDE 4 MG/1
1 TABLET TABLET, FILM COATED ORAL
Qty: 60 | Refills: 1 | Status: ACTIVE | COMMUNITY

## 2021-09-20 ENCOUNTER — WEB ENCOUNTER (OUTPATIENT)
Dept: URBAN - METROPOLITAN AREA CLINIC 98 | Facility: CLINIC | Age: 51
End: 2021-09-20

## 2021-10-04 ENCOUNTER — TELEPHONE ENCOUNTER (OUTPATIENT)
Dept: URBAN - METROPOLITAN AREA SURGERY CENTER 30 | Facility: SURGERY CENTER | Age: 51
End: 2021-10-04

## 2021-10-11 ENCOUNTER — OFFICE VISIT (OUTPATIENT)
Dept: URBAN - METROPOLITAN AREA CLINIC 77 | Facility: CLINIC | Age: 51
End: 2021-10-11

## 2021-10-14 ENCOUNTER — OFFICE VISIT (OUTPATIENT)
Dept: URBAN - METROPOLITAN AREA CLINIC 91 | Facility: CLINIC | Age: 51
End: 2021-10-14
Payer: SELF-PAY

## 2021-10-14 VITALS
HEIGHT: 69 IN | SYSTOLIC BLOOD PRESSURE: 127 MMHG | HEART RATE: 114 BPM | RESPIRATION RATE: 18 BRPM | TEMPERATURE: 97.7 F | BODY MASS INDEX: 20.41 KG/M2 | WEIGHT: 137.8 LBS | DIASTOLIC BLOOD PRESSURE: 88 MMHG

## 2021-10-14 DIAGNOSIS — K50.80 CROHN'S COLITIS: ICD-10-CM

## 2021-10-14 PROCEDURE — 96375 TX/PRO/DX INJ NEW DRUG ADDON: CPT | Performed by: INTERNAL MEDICINE

## 2021-10-14 PROCEDURE — 96413 CHEMO IV INFUSION 1 HR: CPT | Performed by: INTERNAL MEDICINE

## 2021-10-14 PROCEDURE — 96415 CHEMO IV INFUSION ADDL HR: CPT | Performed by: INTERNAL MEDICINE

## 2021-10-14 RX ORDER — ONDANSETRON HYDROCHLORIDE 4 MG/1
1 TABLET TABLET, FILM COATED ORAL
Qty: 60 | Refills: 1 | Status: ACTIVE | COMMUNITY

## 2021-10-14 RX ORDER — FAMOTIDINE 40 MG/1
1 TAB TABLET, FILM COATED ORAL BID
Qty: 60 TABLET | Refills: 11 | Status: ACTIVE | COMMUNITY

## 2021-10-14 RX ORDER — ERGOCALCIFEROL 1.25 MG/1
CAPSULE ORAL
Qty: 0 | Refills: 0 | COMMUNITY
Start: 1900-01-01

## 2021-10-14 RX ORDER — ONDANSETRON 4 MG/1
DISSOLVE ONE TABLET BY MOUTH EVERY 6 HOURS AS NEEDED FOR NAUSEA AND VOMITING TABLET, ORALLY DISINTEGRATING ORAL
Qty: 60 | Refills: 0 | COMMUNITY

## 2021-10-14 RX ORDER — MYCOPHENOLATE MOFETIL 250 MG/1
TAKE 4 CAPSULES (1,000 MG) BY ORAL ROUTE 2 TIMES PER DAY CAPSULE ORAL 2
Qty: 0 | Refills: 0 | COMMUNITY
Start: 1900-01-01

## 2021-10-14 RX ORDER — SUCRALFATE 1 G/1
1 TABLET ON AN EMPTY STOMACH TABLET ORAL TWICE A DAY
Qty: 60 TABLET | Refills: 4 | Status: ACTIVE | COMMUNITY

## 2021-10-14 RX ORDER — PANTOPRAZOLE SODIUM 40 MG/1
1 TABLET TABLET, DELAYED RELEASE ORAL BID
Qty: 60 TABLET | Refills: 11 | Status: ACTIVE | COMMUNITY

## 2021-11-01 ENCOUNTER — OFFICE VISIT (OUTPATIENT)
Dept: URBAN - METROPOLITAN AREA SURGERY CENTER 18 | Facility: SURGERY CENTER | Age: 51
End: 2021-11-01
Payer: SELF-PAY

## 2021-11-01 ENCOUNTER — CLAIMS CREATED FROM THE CLAIM WINDOW (OUTPATIENT)
Dept: URBAN - METROPOLITAN AREA CLINIC 4 | Facility: CLINIC | Age: 51
End: 2021-11-01
Payer: SELF-PAY

## 2021-11-01 DIAGNOSIS — K50.00 CROHN'S DISEASE OF SMALL INTESTINE WITHOUT COMPLICATIONS: ICD-10-CM

## 2021-11-01 DIAGNOSIS — K50.80 CROHN'S COLITIS: ICD-10-CM

## 2021-11-01 PROCEDURE — G8907 PT DOC NO EVENTS ON DISCHARG: HCPCS | Performed by: INTERNAL MEDICINE

## 2021-11-01 PROCEDURE — 44382 SMALL BOWEL ENDOSCOPY: CPT | Performed by: INTERNAL MEDICINE

## 2021-11-01 PROCEDURE — 88305 TISSUE EXAM BY PATHOLOGIST: CPT | Performed by: PATHOLOGY

## 2021-11-01 RX ORDER — ONDANSETRON 4 MG/1
DISSOLVE ONE TABLET BY MOUTH EVERY 6 HOURS AS NEEDED FOR NAUSEA AND VOMITING TABLET, ORALLY DISINTEGRATING ORAL
Qty: 60 | Refills: 0 | COMMUNITY

## 2021-11-01 RX ORDER — PANTOPRAZOLE SODIUM 40 MG/1
1 TABLET TABLET, DELAYED RELEASE ORAL BID
Qty: 60 TABLET | Refills: 11 | Status: ACTIVE | COMMUNITY

## 2021-11-01 RX ORDER — FAMOTIDINE 40 MG/1
1 TAB TABLET, FILM COATED ORAL BID
Qty: 60 TABLET | Refills: 11 | Status: ACTIVE | COMMUNITY

## 2021-11-01 RX ORDER — ONDANSETRON HYDROCHLORIDE 4 MG/1
1 TABLET TABLET, FILM COATED ORAL
Qty: 60 | Refills: 1 | Status: ACTIVE | COMMUNITY

## 2021-11-01 RX ORDER — ERGOCALCIFEROL 1.25 MG/1
CAPSULE ORAL
Qty: 0 | Refills: 0 | COMMUNITY
Start: 1900-01-01

## 2021-11-01 RX ORDER — SUCRALFATE 1 G/1
1 TABLET ON AN EMPTY STOMACH TABLET ORAL TWICE A DAY
Qty: 60 TABLET | Refills: 4 | Status: ACTIVE | COMMUNITY

## 2021-11-01 RX ORDER — MYCOPHENOLATE MOFETIL 250 MG/1
TAKE 4 CAPSULES (1,000 MG) BY ORAL ROUTE 2 TIMES PER DAY CAPSULE ORAL 2
Qty: 0 | Refills: 0 | COMMUNITY
Start: 1900-01-01

## 2021-11-11 ENCOUNTER — TELEPHONE ENCOUNTER (OUTPATIENT)
Dept: URBAN - METROPOLITAN AREA CLINIC 97 | Facility: CLINIC | Age: 51
End: 2021-11-11

## 2021-11-11 ENCOUNTER — OFFICE VISIT (OUTPATIENT)
Dept: URBAN - METROPOLITAN AREA CLINIC 97 | Facility: CLINIC | Age: 51
End: 2021-11-11
Payer: SELF-PAY

## 2021-11-11 VITALS
HEART RATE: 91 BPM | TEMPERATURE: 96.8 F | RESPIRATION RATE: 18 BRPM | DIASTOLIC BLOOD PRESSURE: 83 MMHG | SYSTOLIC BLOOD PRESSURE: 126 MMHG | BODY MASS INDEX: 21.92 KG/M2 | WEIGHT: 148 LBS | HEIGHT: 69 IN

## 2021-11-11 DIAGNOSIS — K50.80 CROHN'S COLITIS: ICD-10-CM

## 2021-11-11 PROCEDURE — 96375 TX/PRO/DX INJ NEW DRUG ADDON: CPT | Performed by: INTERNAL MEDICINE

## 2021-11-11 PROCEDURE — 96415 CHEMO IV INFUSION ADDL HR: CPT | Performed by: INTERNAL MEDICINE

## 2021-11-11 PROCEDURE — 96413 CHEMO IV INFUSION 1 HR: CPT | Performed by: INTERNAL MEDICINE

## 2021-11-11 RX ORDER — ERGOCALCIFEROL 1.25 MG/1
CAPSULE ORAL
Qty: 0 | Refills: 0 | COMMUNITY
Start: 1900-01-01

## 2021-11-11 RX ORDER — PANTOPRAZOLE SODIUM 40 MG/1
1 TABLET TABLET, DELAYED RELEASE ORAL BID
Qty: 60 TABLET | Refills: 11 | Status: ACTIVE | COMMUNITY

## 2021-11-11 RX ORDER — FAMOTIDINE 40 MG/1
1 TAB TABLET, FILM COATED ORAL BID
Qty: 60 TABLET | Refills: 11 | Status: ACTIVE | COMMUNITY

## 2021-11-11 RX ORDER — ONDANSETRON 4 MG/1
DISSOLVE ONE TABLET BY MOUTH EVERY 6 HOURS AS NEEDED FOR NAUSEA AND VOMITING TABLET, ORALLY DISINTEGRATING ORAL
Qty: 60 | Refills: 0 | COMMUNITY

## 2021-11-11 RX ORDER — SUCRALFATE 1 G/1
1 TABLET ON AN EMPTY STOMACH TABLET ORAL TWICE A DAY
Qty: 60 TABLET | Refills: 4 | Status: ACTIVE | COMMUNITY

## 2021-11-11 RX ORDER — ONDANSETRON HYDROCHLORIDE 4 MG/1
1 TABLET TABLET, FILM COATED ORAL
Qty: 60 | Refills: 1 | Status: ACTIVE | COMMUNITY

## 2021-11-11 RX ORDER — MYCOPHENOLATE MOFETIL 250 MG/1
TAKE 4 CAPSULES (1,000 MG) BY ORAL ROUTE 2 TIMES PER DAY CAPSULE ORAL 2
Qty: 0 | Refills: 0 | COMMUNITY
Start: 1900-01-01

## 2021-12-10 ENCOUNTER — OFFICE VISIT (OUTPATIENT)
Dept: URBAN - METROPOLITAN AREA CLINIC 97 | Facility: CLINIC | Age: 51
End: 2021-12-10
Payer: SELF-PAY

## 2021-12-10 DIAGNOSIS — K50.80 CROHN'S COLITIS: ICD-10-CM

## 2021-12-10 PROCEDURE — 96375 TX/PRO/DX INJ NEW DRUG ADDON: CPT | Performed by: INTERNAL MEDICINE

## 2021-12-10 PROCEDURE — 96413 CHEMO IV INFUSION 1 HR: CPT | Performed by: INTERNAL MEDICINE

## 2021-12-10 PROCEDURE — 96415 CHEMO IV INFUSION ADDL HR: CPT | Performed by: INTERNAL MEDICINE

## 2021-12-10 RX ORDER — FAMOTIDINE 40 MG/1
1 TAB TABLET, FILM COATED ORAL BID
Qty: 60 TABLET | Refills: 11 | Status: ACTIVE | COMMUNITY

## 2021-12-10 RX ORDER — PANTOPRAZOLE SODIUM 40 MG/1
1 TABLET TABLET, DELAYED RELEASE ORAL BID
Qty: 60 TABLET | Refills: 11 | Status: ACTIVE | COMMUNITY

## 2021-12-10 RX ORDER — ONDANSETRON HYDROCHLORIDE 4 MG/1
1 TABLET TABLET, FILM COATED ORAL
Qty: 60 | Refills: 1 | Status: ACTIVE | COMMUNITY

## 2021-12-10 RX ORDER — ONDANSETRON 4 MG/1
DISSOLVE ONE TABLET BY MOUTH EVERY 6 HOURS AS NEEDED FOR NAUSEA AND VOMITING TABLET, ORALLY DISINTEGRATING ORAL
Qty: 60 | Refills: 0 | COMMUNITY

## 2021-12-10 RX ORDER — ERGOCALCIFEROL 1.25 MG/1
CAPSULE ORAL
Qty: 0 | Refills: 0 | COMMUNITY
Start: 1900-01-01

## 2021-12-10 RX ORDER — SUCRALFATE 1 G/1
1 TABLET ON AN EMPTY STOMACH TABLET ORAL TWICE A DAY
Qty: 60 TABLET | Refills: 4 | Status: ACTIVE | COMMUNITY

## 2021-12-10 RX ORDER — MYCOPHENOLATE MOFETIL 250 MG/1
TAKE 4 CAPSULES (1,000 MG) BY ORAL ROUTE 2 TIMES PER DAY CAPSULE ORAL 2
Qty: 0 | Refills: 0 | COMMUNITY
Start: 1900-01-01

## 2022-01-07 ENCOUNTER — OFFICE VISIT (OUTPATIENT)
Dept: URBAN - METROPOLITAN AREA CLINIC 97 | Facility: CLINIC | Age: 52
End: 2022-01-07
Payer: SELF-PAY

## 2022-01-07 VITALS
RESPIRATION RATE: 16 BRPM | WEIGHT: 153 LBS | DIASTOLIC BLOOD PRESSURE: 89 MMHG | HEART RATE: 95 BPM | SYSTOLIC BLOOD PRESSURE: 134 MMHG | HEIGHT: 69 IN | BODY MASS INDEX: 22.66 KG/M2 | TEMPERATURE: 96.3 F

## 2022-01-07 DIAGNOSIS — K50.80 CROHN'S COLITIS: ICD-10-CM

## 2022-01-07 PROCEDURE — 96375 TX/PRO/DX INJ NEW DRUG ADDON: CPT | Performed by: INTERNAL MEDICINE

## 2022-01-07 PROCEDURE — 96413 CHEMO IV INFUSION 1 HR: CPT | Performed by: INTERNAL MEDICINE

## 2022-01-07 PROCEDURE — 96415 CHEMO IV INFUSION ADDL HR: CPT | Performed by: INTERNAL MEDICINE

## 2022-01-07 RX ORDER — ONDANSETRON HYDROCHLORIDE 4 MG/1
1 TABLET TABLET, FILM COATED ORAL
Qty: 60 | Refills: 1 | Status: ACTIVE | COMMUNITY

## 2022-01-07 RX ORDER — SUCRALFATE 1 G/1
1 TABLET ON AN EMPTY STOMACH TABLET ORAL TWICE A DAY
Qty: 60 TABLET | Refills: 4 | Status: ACTIVE | COMMUNITY

## 2022-01-07 RX ORDER — FAMOTIDINE 40 MG/1
1 TAB TABLET, FILM COATED ORAL BID
Qty: 60 TABLET | Refills: 11 | Status: ACTIVE | COMMUNITY

## 2022-01-07 RX ORDER — ERGOCALCIFEROL 1.25 MG/1
CAPSULE ORAL
Qty: 0 | Refills: 0 | COMMUNITY
Start: 1900-01-01

## 2022-01-07 RX ORDER — MYCOPHENOLATE MOFETIL 250 MG/1
TAKE 4 CAPSULES (1,000 MG) BY ORAL ROUTE 2 TIMES PER DAY CAPSULE ORAL 2
Qty: 0 | Refills: 0 | COMMUNITY
Start: 1900-01-01

## 2022-01-07 RX ORDER — PANTOPRAZOLE SODIUM 40 MG/1
1 TABLET TABLET, DELAYED RELEASE ORAL BID
Qty: 60 TABLET | Refills: 11 | Status: ACTIVE | COMMUNITY

## 2022-01-07 RX ORDER — ONDANSETRON 4 MG/1
DISSOLVE ONE TABLET BY MOUTH EVERY 6 HOURS AS NEEDED FOR NAUSEA AND VOMITING TABLET, ORALLY DISINTEGRATING ORAL
Qty: 60 | Refills: 0 | COMMUNITY

## 2022-02-08 ENCOUNTER — OFFICE VISIT (OUTPATIENT)
Dept: URBAN - METROPOLITAN AREA CLINIC 97 | Facility: CLINIC | Age: 52
End: 2022-02-08
Payer: SELF-PAY

## 2022-02-08 ENCOUNTER — TELEPHONE ENCOUNTER (OUTPATIENT)
Dept: URBAN - METROPOLITAN AREA CLINIC 92 | Facility: CLINIC | Age: 52
End: 2022-02-08

## 2022-02-08 VITALS
SYSTOLIC BLOOD PRESSURE: 127 MMHG | DIASTOLIC BLOOD PRESSURE: 84 MMHG | BODY MASS INDEX: 21.92 KG/M2 | TEMPERATURE: 96.7 F | HEART RATE: 84 BPM | RESPIRATION RATE: 18 BRPM | HEIGHT: 69 IN | WEIGHT: 148 LBS

## 2022-02-08 DIAGNOSIS — K50.80 CROHN'S COLITIS: ICD-10-CM

## 2022-02-08 PROCEDURE — 96375 TX/PRO/DX INJ NEW DRUG ADDON: CPT | Performed by: INTERNAL MEDICINE

## 2022-02-08 PROCEDURE — 96413 CHEMO IV INFUSION 1 HR: CPT | Performed by: INTERNAL MEDICINE

## 2022-02-08 PROCEDURE — 96415 CHEMO IV INFUSION ADDL HR: CPT | Performed by: INTERNAL MEDICINE

## 2022-02-08 RX ORDER — PANTOPRAZOLE SODIUM 40 MG/1
1 TABLET TABLET, DELAYED RELEASE ORAL BID
Qty: 60 TABLET | Refills: 11 | Status: ACTIVE | COMMUNITY

## 2022-02-08 RX ORDER — FAMOTIDINE 40 MG/1
1 TAB TABLET, FILM COATED ORAL BID
Qty: 60 TABLET | Refills: 11 | Status: ACTIVE | COMMUNITY

## 2022-02-08 RX ORDER — SUCRALFATE 1 G/1
1 TABLET ON AN EMPTY STOMACH TABLET ORAL TWICE A DAY
Qty: 60 TABLET | Refills: 4 | Status: ACTIVE | COMMUNITY

## 2022-02-08 RX ORDER — ONDANSETRON HYDROCHLORIDE 4 MG/1
1 TABLET TABLET, FILM COATED ORAL
Qty: 60 | Refills: 1 | Status: ACTIVE | COMMUNITY

## 2022-02-08 RX ORDER — MYCOPHENOLATE MOFETIL 250 MG/1
TAKE 4 CAPSULES (1,000 MG) BY ORAL ROUTE 2 TIMES PER DAY CAPSULE ORAL 2
Qty: 0 | Refills: 0 | COMMUNITY
Start: 1900-01-01

## 2022-02-08 RX ORDER — ERGOCALCIFEROL 1.25 MG/1
CAPSULE ORAL
Qty: 0 | Refills: 0 | COMMUNITY
Start: 1900-01-01

## 2022-02-08 RX ORDER — ONDANSETRON 4 MG/1
DISSOLVE ONE TABLET BY MOUTH EVERY 6 HOURS AS NEEDED FOR NAUSEA AND VOMITING TABLET, ORALLY DISINTEGRATING ORAL
Qty: 60 | Refills: 0 | COMMUNITY

## 2022-02-11 ENCOUNTER — CLAIMS CREATED FROM THE CLAIM WINDOW (OUTPATIENT)
Dept: URBAN - METROPOLITAN AREA MEDICAL CENTER 33 | Facility: MEDICAL CENTER | Age: 52
End: 2022-02-11
Payer: SELF-PAY

## 2022-02-11 ENCOUNTER — OUT OF OFFICE VISIT (OUTPATIENT)
Dept: URBAN - METROPOLITAN AREA MEDICAL CENTER 33 | Facility: MEDICAL CENTER | Age: 52
End: 2022-02-11

## 2022-02-11 DIAGNOSIS — Z94.4 HISTORY OF LIVER TRANSPLANT: ICD-10-CM

## 2022-02-11 DIAGNOSIS — D50.8 ACQUIRED IRON DEFICIENCY ANEMIA DUE TO DECREASED ABSORPTION: ICD-10-CM

## 2022-02-11 DIAGNOSIS — R74.8 ABNORMAL ALKALINE PHOSPHATASE TEST: ICD-10-CM

## 2022-02-11 DIAGNOSIS — K50.90 ABDOMINAL PAIN DESPITE THERAPY FOR CROHN'S DISEASE: ICD-10-CM

## 2022-02-11 PROCEDURE — 99222 1ST HOSP IP/OBS MODERATE 55: CPT | Performed by: INTERNAL MEDICINE

## 2022-02-11 PROCEDURE — G8427 DOCREV CUR MEDS BY ELIG CLIN: HCPCS | Performed by: INTERNAL MEDICINE

## 2022-02-12 ENCOUNTER — CLAIMS CREATED FROM THE CLAIM WINDOW (OUTPATIENT)
Dept: URBAN - METROPOLITAN AREA MEDICAL CENTER 33 | Facility: MEDICAL CENTER | Age: 52
End: 2022-02-12

## 2022-02-12 ENCOUNTER — CLAIMS CREATED FROM THE CLAIM WINDOW (OUTPATIENT)
Dept: URBAN - METROPOLITAN AREA MEDICAL CENTER 33 | Facility: MEDICAL CENTER | Age: 52
End: 2022-02-12
Payer: SELF-PAY

## 2022-02-12 DIAGNOSIS — K31.89 ACQUIRED DEFORMITY OF DUODENUM: ICD-10-CM

## 2022-02-12 DIAGNOSIS — K52.89 (LYMPHOCYTIC) MICROSCOPIC COLITIS: ICD-10-CM

## 2022-02-12 DIAGNOSIS — D50.9 ANEMIA: ICD-10-CM

## 2022-02-12 DIAGNOSIS — K50.90 ABDOMINAL PAIN DESPITE THERAPY FOR CROHN'S DISEASE: ICD-10-CM

## 2022-02-12 DIAGNOSIS — R74.8 ABNORMAL ALKALINE PHOSPHATASE TEST: ICD-10-CM

## 2022-02-12 DIAGNOSIS — D50.8 ACQUIRED IRON DEFICIENCY ANEMIA DUE TO DECREASED ABSORPTION: ICD-10-CM

## 2022-02-12 DIAGNOSIS — K92.2 ACUTE GASTROINTESTINAL BLEEDING: ICD-10-CM

## 2022-02-12 DIAGNOSIS — R19.7 ACUTE DIARRHEA: ICD-10-CM

## 2022-02-12 PROCEDURE — 44382 SMALL BOWEL ENDOSCOPY: CPT | Performed by: INTERNAL MEDICINE

## 2022-02-12 PROCEDURE — 99232 SBSQ HOSP IP/OBS MODERATE 35: CPT | Performed by: INTERNAL MEDICINE

## 2022-02-12 PROCEDURE — 43239 EGD BIOPSY SINGLE/MULTIPLE: CPT | Performed by: INTERNAL MEDICINE

## 2022-02-14 ENCOUNTER — CLAIMS CREATED FROM THE CLAIM WINDOW (OUTPATIENT)
Dept: URBAN - METROPOLITAN AREA MEDICAL CENTER 33 | Facility: MEDICAL CENTER | Age: 52
End: 2022-02-14
Payer: SELF-PAY

## 2022-02-14 ENCOUNTER — OUT OF OFFICE VISIT (OUTPATIENT)
Dept: URBAN - METROPOLITAN AREA MEDICAL CENTER 33 | Facility: MEDICAL CENTER | Age: 52
End: 2022-02-14

## 2022-02-14 DIAGNOSIS — D50.8 ACQUIRED IRON DEFICIENCY ANEMIA DUE TO DECREASED ABSORPTION: ICD-10-CM

## 2022-02-14 DIAGNOSIS — K83.8 ACQUIRED DILATION OF BILE DUCT: ICD-10-CM

## 2022-02-14 DIAGNOSIS — R74.8 ABNORMAL ALKALINE PHOSPHATASE TEST: ICD-10-CM

## 2022-02-14 DIAGNOSIS — K50.90 ABDOMINAL PAIN DESPITE THERAPY FOR CROHN'S DISEASE: ICD-10-CM

## 2022-02-14 PROCEDURE — 99232 SBSQ HOSP IP/OBS MODERATE 35: CPT | Performed by: INTERNAL MEDICINE

## 2022-02-15 ENCOUNTER — TELEPHONE ENCOUNTER (OUTPATIENT)
Dept: URBAN - METROPOLITAN AREA CLINIC 96 | Facility: CLINIC | Age: 52
End: 2022-02-15

## 2022-02-17 ENCOUNTER — OUT OF OFFICE VISIT (OUTPATIENT)
Dept: URBAN - METROPOLITAN AREA MEDICAL CENTER 33 | Facility: MEDICAL CENTER | Age: 52
End: 2022-02-17

## 2022-02-17 ENCOUNTER — CLAIMS CREATED FROM THE CLAIM WINDOW (OUTPATIENT)
Dept: URBAN - METROPOLITAN AREA MEDICAL CENTER 33 | Facility: MEDICAL CENTER | Age: 52
End: 2022-02-17
Payer: SELF-PAY

## 2022-02-17 DIAGNOSIS — R11.2 ACUTE NAUSEA WITH NONBILIOUS VOMITING: ICD-10-CM

## 2022-02-17 DIAGNOSIS — D50.8 ACQUIRED IRON DEFICIENCY ANEMIA DUE TO DECREASED ABSORPTION: ICD-10-CM

## 2022-02-17 DIAGNOSIS — K50.90 ABDOMINAL PAIN DESPITE THERAPY FOR CROHN'S DISEASE: ICD-10-CM

## 2022-02-17 DIAGNOSIS — K83.8 ACQUIRED DILATION OF BILE DUCT: ICD-10-CM

## 2022-02-17 PROCEDURE — 99232 SBSQ HOSP IP/OBS MODERATE 35: CPT | Performed by: INTERNAL MEDICINE

## 2022-02-21 ENCOUNTER — TELEPHONE ENCOUNTER (OUTPATIENT)
Dept: URBAN - METROPOLITAN AREA CLINIC 92 | Facility: CLINIC | Age: 52
End: 2022-02-21

## 2022-02-21 ENCOUNTER — OFFICE VISIT (OUTPATIENT)
Dept: URBAN - METROPOLITAN AREA TELEHEALTH 2 | Facility: TELEHEALTH | Age: 52
End: 2022-02-21
Payer: SELF-PAY

## 2022-02-21 DIAGNOSIS — K50.80 CROHN'S DISEASE OF BOTH SMALL AND LARGE INTESTINE WITHOUT COMPLICATION: ICD-10-CM

## 2022-02-21 DIAGNOSIS — K21.9 GERD (GASTROESOPHAGEAL REFLUX DISEASE): ICD-10-CM

## 2022-02-21 DIAGNOSIS — Z79.899 LONG-TERM USE OF IMMUNOSUPPRESSANT MEDICATION: ICD-10-CM

## 2022-02-21 DIAGNOSIS — Z94.4 LIVER TRANSPLANT: ICD-10-CM

## 2022-02-21 DIAGNOSIS — R19.7 DIARRHEA: ICD-10-CM

## 2022-02-21 PROCEDURE — 99215 OFFICE O/P EST HI 40 MIN: CPT | Performed by: INTERNAL MEDICINE

## 2022-02-21 RX ORDER — FAMOTIDINE 40 MG/1
1 TAB TABLET, FILM COATED ORAL BID
Qty: 60 TABLET | Refills: 11 | COMMUNITY

## 2022-02-21 RX ORDER — MYCOPHENOLATE MOFETIL 250 MG/1
TAKE 4 CAPSULES (1,000 MG) BY ORAL ROUTE 2 TIMES PER DAY CAPSULE ORAL 2
Qty: 0 | Refills: 0 | COMMUNITY
Start: 1900-01-01

## 2022-02-21 RX ORDER — ONDANSETRON HYDROCHLORIDE 4 MG/1
1 TABLET TABLET, FILM COATED ORAL
Qty: 60 | Refills: 1 | COMMUNITY

## 2022-02-21 RX ORDER — PANTOPRAZOLE SODIUM 40 MG/1
1 TABLET TABLET, DELAYED RELEASE ORAL BID
Qty: 60 TABLET | Refills: 11 | COMMUNITY

## 2022-02-21 RX ORDER — ONDANSETRON HYDROCHLORIDE 4 MG/1
1 TABLET TABLET, FILM COATED ORAL
Qty: 60 | Refills: 1 | OUTPATIENT

## 2022-02-21 RX ORDER — INFLIXIMAB 100 MG/10ML
AS DIRECTED INJECTION, POWDER, LYOPHILIZED, FOR SOLUTION INTRAVENOUS
Qty: 100 MILLIGRAMS | Refills: 0 | OUTPATIENT
Start: 2022-02-21 | End: 2022-03-23

## 2022-02-21 RX ORDER — PANTOPRAZOLE SODIUM 40 MG/1
1 TABLET TABLET, DELAYED RELEASE ORAL BID
Qty: 60 TABLET | Refills: 11 | OUTPATIENT

## 2022-02-21 RX ORDER — ONDANSETRON 4 MG/1
DISSOLVE ONE TABLET BY MOUTH EVERY 6 HOURS AS NEEDED FOR NAUSEA AND VOMITING TABLET, ORALLY DISINTEGRATING ORAL
Qty: 60 | Refills: 0 | COMMUNITY

## 2022-02-21 RX ORDER — SUCRALFATE 1 G/1
1 TABLET ON AN EMPTY STOMACH TABLET ORAL TWICE A DAY
Qty: 60 TABLET | Refills: 4 | OUTPATIENT

## 2022-02-21 RX ORDER — FAMOTIDINE 40 MG/1
1 TAB TABLET, FILM COATED ORAL BID
Qty: 60 TABLET | Refills: 11 | OUTPATIENT

## 2022-02-21 RX ORDER — SUCRALFATE 1 G/1
1 TABLET ON AN EMPTY STOMACH TABLET ORAL TWICE A DAY
Qty: 60 TABLET | Refills: 4 | COMMUNITY

## 2022-02-21 RX ORDER — ERGOCALCIFEROL 1.25 MG/1
CAPSULE ORAL
Qty: 0 | Refills: 0 | COMMUNITY
Start: 1900-01-01

## 2022-02-21 NOTE — HPI-TODAY'S VISIT:
Pt Sonali is a 51 y/o indiv with ileal CD, currently on Inflectra/Remicade (every 4 weeks 5mg/kg), here for refractory disease. . Pt referred by Dr. Felder. . Pt was initially diagnosed with UC (in [place]) and then CD.  She has colectomy 1983 (9th grade).  She has liver transplant 2011 (on MMF and tacro).  She has not been on a lot of meds for her CD since her liver transplant.  She has also been on Remicade in the past and did really well with this, but it was not continued after liver transplant due to concerns about whether it would be ok after transplant. After starting Entyvio (around 5/2019) she felt really well.  Previously on 10/24/2019, pt reports that she has a lot of diarrhea and has been experiencing a lot of dehydration.  She has had a w/u of diarrhea (nl pancreatic elastase).  She has been adjusting her entyvio from every 6 weeks then to every 4 weeks.  She is on prednisone 5mg (tapered from dose of 40mg).  She has to empty out her ostomy about 4 times per day.  She has abdominal pain at the site of her stoma and gets very dehydrated.  She subjectively has noted that adjusting the dose of entyvio has improved somewhat.  Has lost some weight (after illness)  Previously, pt reports that she continues to have lot of diarrhea, lot of breakthrough sxs within week after her infusion.  Still on steroids.  Getting dehydrated;  PPI was not approved by insurance. Previously on 1/27/2020, pt reports that she has been feeling ill still after being discharged from hospital last week. Lot of diarrhea, lot of abdominal pain. . Previously on 3/9/2020, pt reports that after her first infusion of Remicade (without loading), she felt great.  She had some breakthrough sxs about 4 weeks after the remicade infusion.  She has tapered off of prednisone.  After getting her Remicade, her sxs improved. . Previously on 12/15/2020, pt reports that her OLTx team has increased her MMF dose (to help with the AB).  She continues to have nausea with only 1 meal per day; she has had difficulty gaining weight.  There is not blood in the stool, but has diarrhea still.  Was given 2 iron infusions for her anemia. . Previously on 3/10/2021, pt reports that she is now on imuran (no longer on cellept).  Seeing improvement in terms of her GI sxs, however, she is having a sensation that food is getting stuck in her esophagus and in the mid-abd area.  She also has pain in her stoma as well and has to relieve the distention by massage. She has mild nausea. . Previously on 4/21/2021, pt reports that she is having severe reflux sxs and severe, causing her to have right sided abdominal pain, worse when she eats.  EGD on 4/2021 revealed severe esophagitis. . Previously on 7/28/2021, pt reports that she had a blood transfusion performed at [place] ([facility]). She also saw hematologist to get iron infusion.  No blood in the stool, she has up to 8-10 BM per day, filling up her pouch.  Having a lot of gastric irritation/reflux sxs.   . Today on 2/21/2022, pt reports that was hospitalized for anemia (had hgb 7.1 down 5.8).  Had ileoscopy performed at [place], revealed benign tissue.  She feels that the Inflectra is not working and is causing her to flare continuously.  She has been inflectra for 4 months now. . 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 . 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 5/2019: quant gold neg, Hep B neg; Hep C pos . 4/2021: EGD: The examined duodenum was normal. Findings: : Normal Diffuse mildly erythematous mucosa without bleeding was found in the entire examined stomach. Biopsies were taken with a cold forceps for histology. The pathology specimen was placed into Bottle Number 1. Esophagitis with no bleeding was found. Biopsies were taken with a cold forceps for histology. The pathology specimen was placed into Bottle Number 2. . A) Stomach, Biopsy: CHRONIC INACTIVE GASTRITIS WITH HISTOLOGICAL CHANGES SUGGESTIVE OF TREATED H. PYLORI GASTRITIS. See Comment. No Evidence of H. Pylori Organisms or Intestinal Metaplasia. Negative For Dysplasia or Malignancy. COMMENT: The biopsy shows band-like superficial and interstitial deeper lymphoplasmacytic infiltrate and variable presence of some lymphoid follicles, without acute inflammation. No Helicobacter organisms are identified on Meghann and special stains. The histologic findings may represent past H. pylori gastritis treated with antibiotics and/or proton-pump inhibitors. Non-invasive test (such as urea breath test, fecal antigen detection or serum antibody) might be helpful to completely exclude H. pylori infection if clinically indicated. (B) Esophagus, Biopsy: SQUAMOUS MUCOSA WITH FOCAL ACUTE EROSION ARISING IN A BACKGROUND OF SEVERE REFLUX-TYPE CHANGES, CONSISTENT WITH REFLUX- ASSOCIATED EROSIVE ESOPHAGITIS. No Evidence of Graham's Esophagus or Eosinophilic Esophagitis. Negative for Infectious organisms, Dysplasia or Malignancy. . 1/30/2020: Colonoscopy via stoma The scope was advanced 40cm from the ostomy entry site. Patchy inflammation, moderate in severity and characterized by erosions, erythema, friability and granularity was found in the proximal ileum.  Biopsies were taken with a cold forceps for histology.  The pathology specimen was placed into Bottle Number 1. The proximal ileum appeared normal. Diffuse inflammation characterized by erosions, erythema, friability and granularity was found in the distal ileum. Biopsies were taken with a cold forceps for histology.  The pathology specimen was placed into Bottle Number 2. Overall, the proximal mucosa was normal.  The disease appears to be concentrated distally with area of normal mucosa proximal to this and then a skip lesions proximally (as per above) and then normal proxima to this.  A. Proximal Terminal I leum , Biopsy: -Moderate chronic active enteritis (see comment) -No granulomas, dysplasia, or malignancy. B. Terminal I leum , Biopsy: -Moderate chronic active enteritis (see comment) -No granulomas, dysplasia, or malignancy. . 4/2019: Colonoscopy A. The findings are consistent with history of Crohn's disease. However, the findings are not specific. The  differential diagnosis also includes enteritis associated with NSAIDs or other medication, mucosal trauma. prolapse, or infection. No viral inclusions are seen. As always, primary inflammatory bowel disease is a  diagnosis of exclusion, other possible etiologies need to be clinically excluded.  . A. Smal l Bowel , Biopsy: -Acute ileitis with erosion, consistent with clinical history of Crohn's, see comment. -No granulomas, negative for dysplasia or malignancy. . 8/2016: mild active ileitis

## 2022-03-07 ENCOUNTER — OFFICE VISIT (OUTPATIENT)
Dept: URBAN - METROPOLITAN AREA CLINIC 97 | Facility: CLINIC | Age: 52
End: 2022-03-07
Payer: SELF-PAY

## 2022-03-07 VITALS
TEMPERATURE: 96.8 F | HEART RATE: 97 BPM | WEIGHT: 149 LBS | DIASTOLIC BLOOD PRESSURE: 87 MMHG | SYSTOLIC BLOOD PRESSURE: 125 MMHG | HEIGHT: 69 IN | BODY MASS INDEX: 22.07 KG/M2 | RESPIRATION RATE: 18 BRPM

## 2022-03-07 DIAGNOSIS — K50.80 CROHN'S COLITIS: ICD-10-CM

## 2022-03-07 PROCEDURE — 96375 TX/PRO/DX INJ NEW DRUG ADDON: CPT | Performed by: INTERNAL MEDICINE

## 2022-03-07 PROCEDURE — 96415 CHEMO IV INFUSION ADDL HR: CPT | Performed by: INTERNAL MEDICINE

## 2022-03-07 PROCEDURE — 96413 CHEMO IV INFUSION 1 HR: CPT | Performed by: INTERNAL MEDICINE

## 2022-03-07 RX ORDER — SUCRALFATE 1 G/1
1 TABLET ON AN EMPTY STOMACH TABLET ORAL TWICE A DAY
Qty: 60 TABLET | Refills: 4 | Status: ACTIVE | COMMUNITY

## 2022-03-07 RX ORDER — PANTOPRAZOLE SODIUM 40 MG/1
1 TABLET TABLET, DELAYED RELEASE ORAL BID
Qty: 60 TABLET | Refills: 11 | Status: ACTIVE | COMMUNITY

## 2022-03-07 RX ORDER — MYCOPHENOLATE MOFETIL 250 MG/1
TAKE 4 CAPSULES (1,000 MG) BY ORAL ROUTE 2 TIMES PER DAY CAPSULE ORAL 2
Qty: 0 | Refills: 0 | COMMUNITY
Start: 1900-01-01

## 2022-03-07 RX ORDER — ERGOCALCIFEROL 1.25 MG/1
CAPSULE ORAL
Qty: 0 | Refills: 0 | COMMUNITY
Start: 1900-01-01

## 2022-03-07 RX ORDER — ONDANSETRON 4 MG/1
DISSOLVE ONE TABLET BY MOUTH EVERY 6 HOURS AS NEEDED FOR NAUSEA AND VOMITING TABLET, ORALLY DISINTEGRATING ORAL
Qty: 60 | Refills: 0 | COMMUNITY

## 2022-03-07 RX ORDER — FAMOTIDINE 40 MG/1
1 TAB TABLET, FILM COATED ORAL BID
Qty: 60 TABLET | Refills: 11 | Status: ACTIVE | COMMUNITY

## 2022-03-07 RX ORDER — INFLIXIMAB 100 MG/10ML
AS DIRECTED INJECTION, POWDER, LYOPHILIZED, FOR SOLUTION INTRAVENOUS
Qty: 100 MILLIGRAMS | Refills: 0 | Status: ACTIVE | COMMUNITY
Start: 2022-02-21 | End: 2022-03-23

## 2022-03-07 RX ORDER — ONDANSETRON HYDROCHLORIDE 4 MG/1
1 TABLET TABLET, FILM COATED ORAL
Qty: 60 | Refills: 1 | Status: ACTIVE | COMMUNITY

## 2022-03-13 LAB
ANTI-INFLIXIMAB ANTIBODY: <22
BASO (ABSOLUTE): 0
BASOS: 1
EOS (ABSOLUTE): 0.2
EOS: 4
HEMATOCRIT: 28.9
HEMATOLOGY COMMENTS:: (no result)
HEMOGLOBIN: 9.1
IMMATURE CELLS: (no result)
IMMATURE GRANS (ABS): 0
IMMATURE GRANULOCYTES: 0
INFLIXIMAB DRUG LEVEL: 6.1
LYMPHS (ABSOLUTE): 1
LYMPHS: 18
MCH: 28
MCHC: 31.5
MCV: 89
MONOCYTES(ABSOLUTE): 0.3
MONOCYTES: 5
NEUTROPHILS (ABSOLUTE): 4.2
NEUTROPHILS: 72
NRBC: (no result)
PLATELETS: 124
QUANTIFERON CRITERIA: (no result)
QUANTIFERON INCUBATION: (no result)
QUANTIFERON MITOGEN VALUE: >10
QUANTIFERON NIL VALUE: 0
QUANTIFERON TB1 AG VALUE: 0
QUANTIFERON TB2 AG VALUE: 0.22
QUANTIFERON-TB GOLD PLUS: NEGATIVE
RBC: 3.25
RDW: 21.4
WBC: 5.8

## 2022-03-16 ENCOUNTER — OFFICE VISIT (OUTPATIENT)
Dept: URBAN - METROPOLITAN AREA CLINIC 96 | Facility: CLINIC | Age: 52
End: 2022-03-16

## 2022-03-28 ENCOUNTER — OFFICE VISIT (OUTPATIENT)
Dept: URBAN - METROPOLITAN AREA TELEHEALTH 2 | Facility: TELEHEALTH | Age: 52
End: 2022-03-28
Payer: SELF-PAY

## 2022-03-28 DIAGNOSIS — R19.7 DIARRHEA: ICD-10-CM

## 2022-03-28 DIAGNOSIS — K50.80 CROHN'S DISEASE OF BOTH SMALL AND LARGE INTESTINE WITHOUT COMPLICATION: ICD-10-CM

## 2022-03-28 DIAGNOSIS — Z79.899 LONG-TERM USE OF IMMUNOSUPPRESSANT MEDICATION: ICD-10-CM

## 2022-03-28 DIAGNOSIS — K21.9 GERD (GASTROESOPHAGEAL REFLUX DISEASE): ICD-10-CM

## 2022-03-28 PROCEDURE — 99215 OFFICE O/P EST HI 40 MIN: CPT | Performed by: INTERNAL MEDICINE

## 2022-03-28 RX ORDER — ONDANSETRON HYDROCHLORIDE 4 MG/1
1 TABLET TABLET, FILM COATED ORAL
Qty: 60 | Refills: 1 | OUTPATIENT

## 2022-03-28 RX ORDER — SUCRALFATE 1 G/1
1 TABLET ON AN EMPTY STOMACH TABLET ORAL TWICE A DAY
Qty: 60 TABLET | Refills: 4 | OUTPATIENT

## 2022-03-28 RX ORDER — MYCOPHENOLATE MOFETIL 250 MG/1
TAKE 4 CAPSULES (1,000 MG) BY ORAL ROUTE 2 TIMES PER DAY CAPSULE ORAL 2
Qty: 0 | Refills: 0 | COMMUNITY
Start: 1900-01-01

## 2022-03-28 RX ORDER — PANTOPRAZOLE SODIUM 40 MG/1
1 TABLET TABLET, DELAYED RELEASE ORAL BID
Qty: 60 TABLET | Refills: 11 | COMMUNITY

## 2022-03-28 RX ORDER — ONDANSETRON HYDROCHLORIDE 4 MG/1
1 TABLET TABLET, FILM COATED ORAL
Qty: 60 | Refills: 1 | COMMUNITY

## 2022-03-28 RX ORDER — PANTOPRAZOLE SODIUM 40 MG/1
1 TABLET TABLET, DELAYED RELEASE ORAL BID
Qty: 60 TABLET | Refills: 11 | OUTPATIENT

## 2022-03-28 RX ORDER — ONDANSETRON 4 MG/1
DISSOLVE ONE TABLET BY MOUTH EVERY 6 HOURS AS NEEDED FOR NAUSEA AND VOMITING TABLET, ORALLY DISINTEGRATING ORAL
Qty: 60 | Refills: 0 | COMMUNITY

## 2022-03-28 RX ORDER — FAMOTIDINE 40 MG/1
1 TAB TABLET, FILM COATED ORAL BID
Qty: 60 TABLET | Refills: 11 | COMMUNITY

## 2022-03-28 RX ORDER — FAMOTIDINE 40 MG/1
1 TAB TABLET, FILM COATED ORAL BID
Qty: 60 TABLET | Refills: 11 | OUTPATIENT

## 2022-03-28 RX ORDER — ERGOCALCIFEROL 1.25 MG/1
CAPSULE ORAL
Qty: 0 | Refills: 0 | COMMUNITY
Start: 1900-01-01

## 2022-03-28 RX ORDER — SUCRALFATE 1 G/1
1 TABLET ON AN EMPTY STOMACH TABLET ORAL TWICE A DAY
Qty: 60 TABLET | Refills: 4 | COMMUNITY

## 2022-03-28 NOTE — HPI-TODAY'S VISIT:
Pt Sonali is a 53 y/o indiv with ileal CD, currently on Inflectra/Remicade (every 4 weeks 5mg/kg), here for refractory disease. . Pt referred by Dr. Felder. . Pt was initially diagnosed with UC (in [place]) and then CD.  She has colectomy 1983 (9th grade).  She has liver transplant 2011 (on MMF and tacro).  She has not been on a lot of meds for her CD since her liver transplant.  She has also been on Remicade in the past and did really well with this, but it was not continued after liver transplant due to concerns about whether it would be ok after transplant. After starting Entyvio (around 5/2019) she felt really well.  Previously on 10/24/2019, pt reports that she has a lot of diarrhea and has been experiencing a lot of dehydration.  She has had a w/u of diarrhea (nl pancreatic elastase).  She has been adjusting her entyvio from every 6 weeks then to every 4 weeks.  She is on prednisone 5mg (tapered from dose of 40mg).  She has to empty out her ostomy about 4 times per day.  She has abdominal pain at the site of her stoma and gets very dehydrated.  She subjectively has noted that adjusting the dose of entyvio has improved somewhat.  Has lost some weight (after illness)  Previously, pt reports that she continues to have lot of diarrhea, lot of breakthrough sxs within week after her infusion.  Still on steroids.  Getting dehydrated;  PPI was not approved by insurance. Previously on 1/27/2020, pt reports that she has been feeling ill still after being discharged from hospital last week. Lot of diarrhea, lot of abdominal pain. . Previously on 3/9/2020, pt reports that after her first infusion of Remicade (without loading), she felt great.  She had some breakthrough sxs about 4 weeks after the remicade infusion.  She has tapered off of prednisone.  After getting her Remicade, her sxs improved. . Previously on 12/15/2020, pt reports that her OLTx team has increased her MMF dose (to help with the AB).  She continues to have nausea with only 1 meal per day; she has had difficulty gaining weight.  There is not blood in the stool, but has diarrhea still.  Was given 2 iron infusions for her anemia. . Previously on 3/10/2021, pt reports that she is now on imuran (no longer on cellept).  Seeing improvement in terms of her GI sxs, however, she is having a sensation that food is getting stuck in her esophagus and in the mid-abd area.  She also has pain in her stoma as well and has to relieve the distention by massage. She has mild nausea. . Previously on 4/21/2021, pt reports that she is having severe reflux sxs and severe, causing her to have right sided abdominal pain, worse when she eats.  EGD on 4/2021 revealed severe esophagitis. . Previously on 7/28/2021, pt reports that she had a blood transfusion performed at [place] ([facility]). She also saw hematologist to get iron infusion.  No blood in the stool, she has up to 8-10 BM per day, filling up her pouch.  Having a lot of gastric irritation/reflux sxs.   . Previously on 2/21/2022, pt reports that was hospitalized for anemia (had hgb 7.1 down 5.8).  Had ileoscopy performed at [place], revealed benign tissue.  She feels that the Inflectra is not working and is causing her to flare continuously.  She has been inflectra for 4 months now. . Today on 3/28/2022, pt reports that she is now approved for the Remicade (she had reaction to inflectra).  She has not been doing well while getting inflectra, so she is excited to be going back.   . 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 . 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 5/2019: quant gold neg, Hep B neg; Hep C pos . 4/2021: EGD: The examined duodenum was normal. Findings: : Normal Diffuse mildly erythematous mucosa without bleeding was found in the entire examined stomach. Biopsies were taken with a cold forceps for histology. The pathology specimen was placed into Bottle Number 1. Esophagitis with no bleeding was found. Biopsies were taken with a cold forceps for histology. The pathology specimen was placed into Bottle Number 2. . A) Stomach, Biopsy: CHRONIC INACTIVE GASTRITIS WITH HISTOLOGICAL CHANGES SUGGESTIVE OF TREATED H. PYLORI GASTRITIS. See Comment. No Evidence of H. Pylori Organisms or Intestinal Metaplasia. Negative For Dysplasia or Malignancy. COMMENT: The biopsy shows band-like superficial and interstitial deeper lymphoplasmacytic infiltrate and variable presence of some lymphoid follicles, without acute inflammation. No Helicobacter organisms are identified on Meghann and special stains. The histologic findings may represent past H. pylori gastritis treated with antibiotics and/or proton-pump inhibitors. Non-invasive test (such as urea breath test, fecal antigen detection or serum antibody) might be helpful to completely exclude H. pylori infection if clinically indicated. (B) Esophagus, Biopsy: SQUAMOUS MUCOSA WITH FOCAL ACUTE EROSION ARISING IN A BACKGROUND OF SEVERE REFLUX-TYPE CHANGES, CONSISTENT WITH REFLUX- ASSOCIATED EROSIVE ESOPHAGITIS. No Evidence of Graham's Esophagus or Eosinophilic Esophagitis. Negative for Infectious organisms, Dysplasia or Malignancy. . 1/30/2020: Colonoscopy via stoma The scope was advanced 40cm from the ostomy entry site. Patchy inflammation, moderate in severity and characterized by erosions, erythema, friability and granularity was found in the proximal ileum.  Biopsies were taken with a cold forceps for histology.  The pathology specimen was placed into Bottle Number 1. The proximal ileum appeared normal. Diffuse inflammation characterized by erosions, erythema, friability and granularity was found in the distal ileum. Biopsies were taken with a cold forceps for histology.  The pathology specimen was placed into Bottle Number 2. Overall, the proximal mucosa was normal.  The disease appears to be concentrated distally with area of normal mucosa proximal to this and then a skip lesions proximally (as per above) and then normal proxima to this.  A. Proximal Terminal I leum , Biopsy: -Moderate chronic active enteritis (see comment) -No granulomas, dysplasia, or malignancy. B. Terminal I leum , Biopsy: -Moderate chronic active enteritis (see comment) -No granulomas, dysplasia, or malignancy. . 4/2019: Colonoscopy A. The findings are consistent with history of Crohn's disease. However, the findings are not specific. The  differential diagnosis also includes enteritis associated with NSAIDs or other medication, mucosal trauma. prolapse, or infection. No viral inclusions are seen. As always, primary inflammatory bowel disease is a  diagnosis of exclusion, other possible etiologies need to be clinically excluded.  . LYNETTE Anguiano l Bowel , Biopsy: -Acute ileitis with erosion, consistent with clinical history of Crohn's, see comment. -No granulomas, negative for dysplasia or malignancy. . 8/2016: mild active ileitis

## 2022-03-28 NOTE — PHYSICAL EXAM GASTROINTESTINAL
74 year old admitted to unit on 9/3 following new onset seizure, with MRI showing large area of R cortical edema with flair and ill defined enhancement on contrast study, concern for glioma vs infectious/inflammatory process  -- LP appears non infectious, cytology negative  -- continue decadron  -- EEG monitoring  -- Wean Propofol as tolerated, minimal sedation requirements since seroquel started  -- Will need repeat MRI imaging, Monday or Tuesday to reevaluate lesion  -- NSGY, epilepsy following   -- Hourly neuro checks  -- Hourly vital signs   Abdomen , soft, nontender, nondistended , no guarding or rigidity , no masses palpable , normal bowel sounds , Liver and Spleen , no hepatomegaly present , no hepatosplenomegaly , liver nontender , spleen not palpable

## 2022-04-03 ENCOUNTER — WEB ENCOUNTER (OUTPATIENT)
Dept: URBAN - METROPOLITAN AREA CLINIC 97 | Facility: CLINIC | Age: 52
End: 2022-04-03

## 2022-04-04 ENCOUNTER — OFFICE VISIT (OUTPATIENT)
Dept: URBAN - METROPOLITAN AREA CLINIC 97 | Facility: CLINIC | Age: 52
End: 2022-04-04
Payer: SELF-PAY

## 2022-04-04 VITALS
RESPIRATION RATE: 18 BRPM | HEIGHT: 69 IN | DIASTOLIC BLOOD PRESSURE: 80 MMHG | TEMPERATURE: 96.5 F | WEIGHT: 148 LBS | HEART RATE: 94 BPM | BODY MASS INDEX: 21.92 KG/M2 | SYSTOLIC BLOOD PRESSURE: 116 MMHG

## 2022-04-04 DIAGNOSIS — K50.80 CROHN'S COLITIS: ICD-10-CM

## 2022-04-04 PROCEDURE — 96413 CHEMO IV INFUSION 1 HR: CPT | Performed by: INTERNAL MEDICINE

## 2022-04-04 PROCEDURE — 96415 CHEMO IV INFUSION ADDL HR: CPT | Performed by: INTERNAL MEDICINE

## 2022-04-04 PROCEDURE — 96375 TX/PRO/DX INJ NEW DRUG ADDON: CPT | Performed by: INTERNAL MEDICINE

## 2022-04-04 RX ORDER — SUCRALFATE 1 G/1
1 TABLET ON AN EMPTY STOMACH TABLET ORAL TWICE A DAY
Qty: 60 TABLET | Refills: 4 | Status: ACTIVE | COMMUNITY

## 2022-04-04 RX ORDER — PANTOPRAZOLE SODIUM 40 MG/1
1 TABLET TABLET, DELAYED RELEASE ORAL BID
Qty: 60 TABLET | Refills: 11 | Status: ACTIVE | COMMUNITY

## 2022-04-04 RX ORDER — ONDANSETRON HYDROCHLORIDE 4 MG/1
1 TABLET TABLET, FILM COATED ORAL
Qty: 60 | Refills: 1 | Status: ACTIVE | COMMUNITY

## 2022-04-04 RX ORDER — MYCOPHENOLATE MOFETIL 250 MG/1
TAKE 4 CAPSULES (1,000 MG) BY ORAL ROUTE 2 TIMES PER DAY CAPSULE ORAL 2
Qty: 0 | Refills: 0 | COMMUNITY
Start: 1900-01-01

## 2022-04-04 RX ORDER — ONDANSETRON 4 MG/1
DISSOLVE ONE TABLET BY MOUTH EVERY 6 HOURS AS NEEDED FOR NAUSEA AND VOMITING TABLET, ORALLY DISINTEGRATING ORAL
Qty: 60 | Refills: 0 | COMMUNITY

## 2022-04-04 RX ORDER — ERGOCALCIFEROL 1.25 MG/1
CAPSULE ORAL
Qty: 0 | Refills: 0 | COMMUNITY
Start: 1900-01-01

## 2022-04-04 RX ORDER — FAMOTIDINE 40 MG/1
1 TAB TABLET, FILM COATED ORAL BID
Qty: 60 TABLET | Refills: 11 | Status: ACTIVE | COMMUNITY

## 2022-05-04 ENCOUNTER — OFFICE VISIT (OUTPATIENT)
Dept: URBAN - METROPOLITAN AREA CLINIC 97 | Facility: CLINIC | Age: 52
End: 2022-05-04
Payer: SELF-PAY

## 2022-05-04 DIAGNOSIS — K50.80 CROHN'S COLITIS: ICD-10-CM

## 2022-05-04 PROCEDURE — 96413 CHEMO IV INFUSION 1 HR: CPT | Performed by: INTERNAL MEDICINE

## 2022-05-04 PROCEDURE — 96415 CHEMO IV INFUSION ADDL HR: CPT | Performed by: INTERNAL MEDICINE

## 2022-05-04 PROCEDURE — 96375 TX/PRO/DX INJ NEW DRUG ADDON: CPT | Performed by: INTERNAL MEDICINE

## 2022-05-04 RX ORDER — FAMOTIDINE 40 MG/1
1 TAB TABLET, FILM COATED ORAL BID
Qty: 60 TABLET | Refills: 11 | Status: ACTIVE | COMMUNITY

## 2022-05-04 RX ORDER — SUCRALFATE 1 G/1
1 TABLET ON AN EMPTY STOMACH TABLET ORAL TWICE A DAY
Qty: 60 TABLET | Refills: 4 | Status: ACTIVE | COMMUNITY

## 2022-05-04 RX ORDER — ERGOCALCIFEROL 1.25 MG/1
CAPSULE ORAL
Qty: 0 | Refills: 0 | COMMUNITY
Start: 1900-01-01

## 2022-05-04 RX ORDER — MYCOPHENOLATE MOFETIL 250 MG/1
TAKE 4 CAPSULES (1,000 MG) BY ORAL ROUTE 2 TIMES PER DAY CAPSULE ORAL 2
Qty: 0 | Refills: 0 | COMMUNITY
Start: 1900-01-01

## 2022-05-04 RX ORDER — ONDANSETRON 4 MG/1
DISSOLVE ONE TABLET BY MOUTH EVERY 6 HOURS AS NEEDED FOR NAUSEA AND VOMITING TABLET, ORALLY DISINTEGRATING ORAL
Qty: 60 | Refills: 0 | COMMUNITY

## 2022-05-04 RX ORDER — PANTOPRAZOLE SODIUM 40 MG/1
1 TABLET TABLET, DELAYED RELEASE ORAL BID
Qty: 60 TABLET | Refills: 11 | Status: ACTIVE | COMMUNITY

## 2022-05-04 RX ORDER — ONDANSETRON HYDROCHLORIDE 4 MG/1
1 TABLET TABLET, FILM COATED ORAL
Qty: 60 | Refills: 1 | Status: ACTIVE | COMMUNITY

## 2022-05-27 ENCOUNTER — CLAIMS CREATED FROM THE CLAIM WINDOW (OUTPATIENT)
Dept: URBAN - METROPOLITAN AREA TELEHEALTH 2 | Facility: TELEHEALTH | Age: 52
End: 2022-05-27
Payer: SELF-PAY

## 2022-05-27 DIAGNOSIS — K21.9 GERD (GASTROESOPHAGEAL REFLUX DISEASE): ICD-10-CM

## 2022-05-27 DIAGNOSIS — R19.7 DIARRHEA: ICD-10-CM

## 2022-05-27 DIAGNOSIS — K50.80 CROHN'S DISEASE OF BOTH SMALL AND LARGE INTESTINE WITHOUT COMPLICATION: ICD-10-CM

## 2022-05-27 DIAGNOSIS — Z79.899 LONG-TERM USE OF IMMUNOSUPPRESSANT MEDICATION: ICD-10-CM

## 2022-05-27 DIAGNOSIS — Z94.4 LIVER TRANSPLANT: ICD-10-CM

## 2022-05-27 DIAGNOSIS — Z09 FOLLOW UP: ICD-10-CM

## 2022-05-27 PROCEDURE — 99215 OFFICE O/P EST HI 40 MIN: CPT | Performed by: INTERNAL MEDICINE

## 2022-05-27 RX ORDER — ONDANSETRON HYDROCHLORIDE 4 MG/1
1 TABLET TABLET, FILM COATED ORAL
Qty: 60 | Refills: 1 | Status: ACTIVE | COMMUNITY

## 2022-05-27 RX ORDER — ERGOCALCIFEROL 1.25 MG/1
CAPSULE ORAL
Qty: 0 | Refills: 0 | COMMUNITY
Start: 1900-01-01

## 2022-05-27 RX ORDER — ONDANSETRON HYDROCHLORIDE 4 MG/1
1 TABLET TABLET, FILM COATED ORAL
Qty: 60 | Refills: 1 | OUTPATIENT

## 2022-05-27 RX ORDER — PANTOPRAZOLE SODIUM 40 MG/1
1 TABLET TABLET, DELAYED RELEASE ORAL BID
Qty: 60 TABLET | Refills: 11 | OUTPATIENT

## 2022-05-27 RX ORDER — SUCRALFATE 1 G/1
1 TABLET ON AN EMPTY STOMACH TABLET ORAL TWICE A DAY
Qty: 60 TABLET | Refills: 4 | OUTPATIENT

## 2022-05-27 RX ORDER — FAMOTIDINE 40 MG/1
1 TAB TABLET, FILM COATED ORAL BID
Qty: 60 TABLET | Refills: 11 | OUTPATIENT

## 2022-05-27 RX ORDER — ONDANSETRON 4 MG/1
DISSOLVE ONE TABLET BY MOUTH EVERY 6 HOURS AS NEEDED FOR NAUSEA AND VOMITING TABLET, ORALLY DISINTEGRATING ORAL
Qty: 60 | Refills: 0 | COMMUNITY

## 2022-05-27 RX ORDER — MYCOPHENOLATE MOFETIL 250 MG/1
TAKE 4 CAPSULES (1,000 MG) BY ORAL ROUTE 2 TIMES PER DAY CAPSULE ORAL 2
Qty: 0 | Refills: 0 | COMMUNITY
Start: 1900-01-01

## 2022-05-27 RX ORDER — PANTOPRAZOLE SODIUM 40 MG/1
1 TABLET TABLET, DELAYED RELEASE ORAL BID
Qty: 60 TABLET | Refills: 11 | Status: ACTIVE | COMMUNITY

## 2022-05-27 RX ORDER — FAMOTIDINE 40 MG/1
1 TAB TABLET, FILM COATED ORAL BID
Qty: 60 TABLET | Refills: 11 | Status: ACTIVE | COMMUNITY

## 2022-05-27 RX ORDER — PREDNISONE 10 MG/1
2 TABLETS TABLET ORAL ONCE A DAY
Qty: 60 | Refills: 0 | OUTPATIENT
Start: 2022-05-27 | End: 2022-06-26

## 2022-05-27 RX ORDER — SUCRALFATE 1 G/1
1 TABLET ON AN EMPTY STOMACH TABLET ORAL TWICE A DAY
Qty: 60 TABLET | Refills: 4 | Status: ACTIVE | COMMUNITY

## 2022-05-27 NOTE — HPI-TODAY'S VISIT:
Pt Sonali is a 51 y/o indiv with ileal CD, currently on Inflectra/Remicade (every 4 weeks 5mg/kg), here for refractory disease. . Pt referred by Dr. Felder. . Pt was initially diagnosed with UC (in [place]) and then CD.  She has colectomy 1983 (9th grade).  She has liver transplant 2011 (on MMF and tacro).  She has not been on a lot of meds for her CD since her liver transplant.  She has also been on Remicade in the past and did really well with this, but it was not continued after liver transplant due to concerns about whether it would be ok after transplant. After starting Entyvio (around 5/2019) she felt really well.  Previously on 10/24/2019, pt reports that she has a lot of diarrhea and has been experiencing a lot of dehydration.  She has had a w/u of diarrhea (nl pancreatic elastase).  She has been adjusting her entyvio from every 6 weeks then to every 4 weeks.  She is on prednisone 5mg (tapered from dose of 40mg).  She has to empty out her ostomy about 4 times per day.  She has abdominal pain at the site of her stoma and gets very dehydrated.  She subjectively has noted that adjusting the dose of entyvio has improved somewhat.  Has lost some weight (after illness)  Previously, pt reports that she continues to have lot of diarrhea, lot of breakthrough sxs within week after her infusion.  Still on steroids.  Getting dehydrated;  PPI was not approved by insurance. Previously on 1/27/2020, pt reports that she has been feeling ill still after being discharged from hospital last week. Lot of diarrhea, lot of abdominal pain. . Previously on 3/9/2020, pt reports that after her first infusion of Remicade (without loading), she felt great.  She had some breakthrough sxs about 4 weeks after the remicade infusion.  She has tapered off of prednisone.  After getting her Remicade, her sxs improved. . Previously on 12/15/2020, pt reports that her OLTx team has increased her MMF dose (to help with the AB).  She continues to have nausea with only 1 meal per day; she has had difficulty gaining weight.  There is not blood in the stool, but has diarrhea still.  Was given 2 iron infusions for her anemia. . Previously on 3/10/2021, pt reports that she is now on imuran (no longer on cellept).  Seeing improvement in terms of her GI sxs, however, she is having a sensation that food is getting stuck in her esophagus and in the mid-abd area.  She also has pain in her stoma as well and has to relieve the distention by massage. She has mild nausea. . Previously on 4/21/2021, pt reports that she is having severe reflux sxs and severe, causing her to have right sided abdominal pain, worse when she eats.  EGD on 4/2021 revealed severe esophagitis. . Previously on 7/28/2021, pt reports that she had a blood transfusion performed at [facility] ([facility]). She also saw hematologist to get iron infusion.  No blood in the stool, she has up to 8-10 BM per day, filling up her pouch.  Having a lot of gastric irritation/reflux sxs.   . Previously on 2/21/2022, pt reports that was hospitalized for anemia (had hgb 7.1 down 5.8).  Had ileoscopy performed at [facility], revealed benign tissue.  She feels that the Inflectra is not working and is causing her to flare continuously.  She has been inflectra for 4 months now. . Previously on 3/28/2022, pt reports that she is now approved for the Remicade (she had reaction to inflectra).  She has not been doing well while getting inflectra, so she is excited to be going back.   . Today on 5/27/2022, pt reports that she is continuing to be anemia.  She has a lot of fatigue and is seeing the hematolgist for iron and blood transfusion.   . 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 . 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 5/2019: quant gold neg, Hep B neg; Hep C pos . 4/2021: EGD: The examined duodenum was normal. Findings: : Normal Diffuse mildly erythematous mucosa without bleeding was found in the entire examined stomach. Biopsies were taken with a cold forceps for histology. The pathology specimen was placed into Bottle Number 1. Esophagitis with no bleeding was found. Biopsies were taken with a cold forceps for histology. The pathology specimen was placed into Bottle Number 2. . A) Stomach, Biopsy: CHRONIC INACTIVE GASTRITIS WITH HISTOLOGICAL CHANGES SUGGESTIVE OF TREATED H. PYLORI GASTRITIS. See Comment. No Evidence of H. Pylori Organisms or Intestinal Metaplasia. Negative For Dysplasia or Malignancy. COMMENT: The biopsy shows band-like superficial and interstitial deeper lymphoplasmacytic infiltrate and variable presence of some lymphoid follicles, without acute inflammation. No Helicobacter organisms are identified on Meghann and special stains. The histologic findings may represent past H. pylori gastritis treated with antibiotics and/or proton-pump inhibitors. Non-invasive test (such as urea breath test, fecal antigen detection or serum antibody) might be helpful to completely exclude H. pylori infection if clinically indicated. (B) Esophagus, Biopsy: SQUAMOUS MUCOSA WITH FOCAL ACUTE EROSION ARISING IN A BACKGROUND OF SEVERE REFLUX-TYPE CHANGES, CONSISTENT WITH REFLUX- ASSOCIATED EROSIVE ESOPHAGITIS. No Evidence of Graham's Esophagus or Eosinophilic Esophagitis. Negative for Infectious organisms, Dysplasia or Malignancy. . 1/30/2020: Colonoscopy via stoma The scope was advanced 40cm from the ostomy entry site. Patchy inflammation, moderate in severity and characterized by erosions, erythema, friability and granularity was found in the proximal ileum.  Biopsies were taken with a cold forceps for histology.  The pathology specimen was placed into Bottle Number 1. The proximal ileum appeared normal. Diffuse inflammation characterized by erosions, erythema, friability and granularity was found in the distal ileum. Biopsies were taken with a cold forceps for histology.  The pathology specimen was placed into Bottle Number 2. Overall, the proximal mucosa was normal.  The disease appears to be concentrated distally with area of normal mucosa proximal to this and then a skip lesions proximally (as per above) and then normal proxima to this.  A. Proximal Terminal I leum , Biopsy: -Moderate chronic active enteritis (see comment) -No granulomas, dysplasia, or malignancy. B. Terminal I leum , Biopsy: -Moderate chronic active enteritis (see comment) -No granulomas, dysplasia, or malignancy. . 4/2019: Colonoscopy A. The findings are consistent with history of Crohn's disease. However, the findings are not specific. The  differential diagnosis also includes enteritis associated with NSAIDs or other medication, mucosal trauma. prolapse, or infection. No viral inclusions are seen. As always, primary inflammatory bowel disease is a  diagnosis of exclusion, other possible etiologies need to be clinically excluded.  . A. Smal l Bowel , Biopsy: -Acute ileitis with erosion, consistent with clinical history of Crohn's, see comment. -No granulomas, negative for dysplasia or malignancy. . 8/2016: mild active ileitis

## 2022-06-01 ENCOUNTER — OUT OF OFFICE VISIT (OUTPATIENT)
Dept: URBAN - METROPOLITAN AREA MEDICAL CENTER 33 | Facility: MEDICAL CENTER | Age: 52
End: 2022-06-01
Payer: SELF-PAY

## 2022-06-01 DIAGNOSIS — K50.90 ABDOMINAL PAIN DESPITE THERAPY FOR CROHN'S DISEASE: ICD-10-CM

## 2022-06-01 DIAGNOSIS — D50.9 ANEMIA: ICD-10-CM

## 2022-06-01 DIAGNOSIS — D69.6 ACQUIRED AMEGAKARYOCYTIC THROMBOCYTOPENIA: ICD-10-CM

## 2022-06-01 DIAGNOSIS — Z94.4 HISTORY OF LIVER TRANSPLANT: ICD-10-CM

## 2022-06-01 PROCEDURE — 99214 OFFICE O/P EST MOD 30 MIN: CPT | Performed by: INTERNAL MEDICINE

## 2022-06-01 PROCEDURE — 99225 SUBSEQUENT OBSERVATION CARE, PER DAY, FOR THE EVALUATION AND MANAGEMENT OF A PATIENT, WHICH REQUIRES AT LEAST 2 OF THESE 3 KEY COMPONENTS: AN EXPAND: CPT | Performed by: INTERNAL MEDICINE

## 2022-06-06 ENCOUNTER — OFFICE VISIT (OUTPATIENT)
Dept: URBAN - METROPOLITAN AREA CLINIC 97 | Facility: CLINIC | Age: 52
End: 2022-06-06
Payer: SELF-PAY

## 2022-06-06 VITALS
RESPIRATION RATE: 18 BRPM | DIASTOLIC BLOOD PRESSURE: 78 MMHG | BODY MASS INDEX: 22.96 KG/M2 | TEMPERATURE: 96.4 F | HEIGHT: 69 IN | HEART RATE: 96 BPM | SYSTOLIC BLOOD PRESSURE: 134 MMHG | WEIGHT: 155 LBS

## 2022-06-06 DIAGNOSIS — K50.80 CROHN'S COLITIS: ICD-10-CM

## 2022-06-06 PROCEDURE — 96415 CHEMO IV INFUSION ADDL HR: CPT | Performed by: INTERNAL MEDICINE

## 2022-06-06 PROCEDURE — 96413 CHEMO IV INFUSION 1 HR: CPT | Performed by: INTERNAL MEDICINE

## 2022-06-06 PROCEDURE — 96375 TX/PRO/DX INJ NEW DRUG ADDON: CPT | Performed by: INTERNAL MEDICINE

## 2022-06-06 RX ORDER — ERGOCALCIFEROL 1.25 MG/1
CAPSULE ORAL
Qty: 0 | Refills: 0 | COMMUNITY
Start: 1900-01-01

## 2022-06-06 RX ORDER — PANTOPRAZOLE SODIUM 40 MG/1
1 TABLET TABLET, DELAYED RELEASE ORAL BID
Qty: 60 TABLET | Refills: 11 | Status: ACTIVE | COMMUNITY

## 2022-06-06 RX ORDER — ONDANSETRON 4 MG/1
DISSOLVE ONE TABLET BY MOUTH EVERY 6 HOURS AS NEEDED FOR NAUSEA AND VOMITING TABLET, ORALLY DISINTEGRATING ORAL
Qty: 60 | Refills: 0 | COMMUNITY

## 2022-06-06 RX ORDER — ONDANSETRON HYDROCHLORIDE 4 MG/1
1 TABLET TABLET, FILM COATED ORAL
Qty: 60 | Refills: 1 | Status: ACTIVE | COMMUNITY

## 2022-06-06 RX ORDER — MYCOPHENOLATE MOFETIL 250 MG/1
TAKE 4 CAPSULES (1,000 MG) BY ORAL ROUTE 2 TIMES PER DAY CAPSULE ORAL 2
Qty: 0 | Refills: 0 | COMMUNITY
Start: 1900-01-01

## 2022-06-06 RX ORDER — FAMOTIDINE 40 MG/1
1 TAB TABLET, FILM COATED ORAL BID
Qty: 60 TABLET | Refills: 11 | Status: ACTIVE | COMMUNITY

## 2022-06-06 RX ORDER — SUCRALFATE 1 G/1
1 TABLET ON AN EMPTY STOMACH TABLET ORAL TWICE A DAY
Qty: 60 TABLET | Refills: 4 | Status: ACTIVE | COMMUNITY

## 2022-06-06 RX ORDER — PREDNISONE 10 MG/1
2 TABLETS TABLET ORAL ONCE A DAY
Qty: 60 | Refills: 0 | Status: ACTIVE | COMMUNITY
Start: 2022-05-27 | End: 2022-06-26

## 2022-07-15 ENCOUNTER — OFFICE VISIT (OUTPATIENT)
Dept: URBAN - METROPOLITAN AREA CLINIC 97 | Facility: CLINIC | Age: 52
End: 2022-07-15
Payer: SELF-PAY

## 2022-07-15 VITALS
HEIGHT: 69 IN | TEMPERATURE: 98.1 F | RESPIRATION RATE: 18 BRPM | BODY MASS INDEX: 21.62 KG/M2 | HEART RATE: 110 BPM | DIASTOLIC BLOOD PRESSURE: 86 MMHG | WEIGHT: 146 LBS | SYSTOLIC BLOOD PRESSURE: 124 MMHG

## 2022-07-15 DIAGNOSIS — K50.80 CROHN'S COLITIS: ICD-10-CM

## 2022-07-15 PROCEDURE — 96413 CHEMO IV INFUSION 1 HR: CPT | Performed by: INTERNAL MEDICINE

## 2022-07-15 PROCEDURE — 96415 CHEMO IV INFUSION ADDL HR: CPT | Performed by: INTERNAL MEDICINE

## 2022-07-15 PROCEDURE — 96375 TX/PRO/DX INJ NEW DRUG ADDON: CPT | Performed by: INTERNAL MEDICINE

## 2022-07-15 RX ORDER — SUCRALFATE 1 G/1
1 TABLET ON AN EMPTY STOMACH TABLET ORAL TWICE A DAY
Qty: 60 TABLET | Refills: 4 | Status: ACTIVE | COMMUNITY

## 2022-07-15 RX ORDER — ONDANSETRON HYDROCHLORIDE 4 MG/1
1 TABLET TABLET, FILM COATED ORAL
Qty: 60 | Refills: 1 | Status: ACTIVE | COMMUNITY

## 2022-07-15 RX ORDER — ONDANSETRON 4 MG/1
DISSOLVE ONE TABLET BY MOUTH EVERY 6 HOURS AS NEEDED FOR NAUSEA AND VOMITING TABLET, ORALLY DISINTEGRATING ORAL
Qty: 60 | Refills: 0 | COMMUNITY

## 2022-07-15 RX ORDER — FAMOTIDINE 40 MG/1
1 TAB TABLET, FILM COATED ORAL BID
Qty: 60 TABLET | Refills: 11 | Status: ACTIVE | COMMUNITY

## 2022-07-15 RX ORDER — ERGOCALCIFEROL 1.25 MG/1
CAPSULE ORAL
Qty: 0 | Refills: 0 | COMMUNITY
Start: 1900-01-01

## 2022-07-15 RX ORDER — PANTOPRAZOLE SODIUM 40 MG/1
1 TABLET TABLET, DELAYED RELEASE ORAL BID
Qty: 60 TABLET | Refills: 11 | Status: ACTIVE | COMMUNITY

## 2022-07-15 RX ORDER — MYCOPHENOLATE MOFETIL 250 MG/1
TAKE 4 CAPSULES (1,000 MG) BY ORAL ROUTE 2 TIMES PER DAY CAPSULE ORAL 2
Qty: 0 | Refills: 0 | COMMUNITY
Start: 1900-01-01

## 2022-08-15 ENCOUNTER — OFFICE VISIT (OUTPATIENT)
Dept: URBAN - METROPOLITAN AREA CLINIC 97 | Facility: CLINIC | Age: 52
End: 2022-08-15
Payer: SELF-PAY

## 2022-08-15 VITALS
HEIGHT: 69 IN | WEIGHT: 155 LBS | TEMPERATURE: 97.6 F | HEART RATE: 108 BPM | BODY MASS INDEX: 22.96 KG/M2 | RESPIRATION RATE: 18 BRPM | SYSTOLIC BLOOD PRESSURE: 134 MMHG | DIASTOLIC BLOOD PRESSURE: 84 MMHG

## 2022-08-15 DIAGNOSIS — K50.80 CROHN'S COLITIS: ICD-10-CM

## 2022-08-15 PROCEDURE — 96375 TX/PRO/DX INJ NEW DRUG ADDON: CPT | Performed by: INTERNAL MEDICINE

## 2022-08-15 PROCEDURE — 96413 CHEMO IV INFUSION 1 HR: CPT | Performed by: INTERNAL MEDICINE

## 2022-08-15 PROCEDURE — 96415 CHEMO IV INFUSION ADDL HR: CPT | Performed by: INTERNAL MEDICINE

## 2022-08-15 RX ORDER — ONDANSETRON HYDROCHLORIDE 4 MG/1
1 TABLET TABLET, FILM COATED ORAL
Qty: 60 | Refills: 1 | Status: ACTIVE | COMMUNITY

## 2022-08-15 RX ORDER — PANTOPRAZOLE SODIUM 40 MG/1
1 TABLET TABLET, DELAYED RELEASE ORAL BID
Qty: 60 TABLET | Refills: 11 | Status: ACTIVE | COMMUNITY

## 2022-08-15 RX ORDER — MYCOPHENOLATE MOFETIL 250 MG/1
TAKE 4 CAPSULES (1,000 MG) BY ORAL ROUTE 2 TIMES PER DAY CAPSULE ORAL 2
Qty: 0 | Refills: 0 | COMMUNITY
Start: 1900-01-01

## 2022-08-15 RX ORDER — ERGOCALCIFEROL 1.25 MG/1
CAPSULE ORAL
Qty: 0 | Refills: 0 | COMMUNITY
Start: 1900-01-01

## 2022-08-15 RX ORDER — SUCRALFATE 1 G/1
1 TABLET ON AN EMPTY STOMACH TABLET ORAL TWICE A DAY
Qty: 60 TABLET | Refills: 4 | Status: ACTIVE | COMMUNITY

## 2022-08-15 RX ORDER — ONDANSETRON 4 MG/1
DISSOLVE ONE TABLET BY MOUTH EVERY 6 HOURS AS NEEDED FOR NAUSEA AND VOMITING TABLET, ORALLY DISINTEGRATING ORAL
Qty: 60 | Refills: 0 | COMMUNITY

## 2022-08-15 RX ORDER — FAMOTIDINE 40 MG/1
1 TAB TABLET, FILM COATED ORAL BID
Qty: 60 TABLET | Refills: 11 | Status: ACTIVE | COMMUNITY

## 2022-08-17 ENCOUNTER — OUT OF OFFICE VISIT (OUTPATIENT)
Dept: URBAN - METROPOLITAN AREA MEDICAL CENTER 33 | Facility: MEDICAL CENTER | Age: 52
End: 2022-08-17
Payer: SELF-PAY

## 2022-08-17 DIAGNOSIS — Z94.4 H/O LIVER TRANSPLANT: ICD-10-CM

## 2022-08-17 DIAGNOSIS — R19.7 ACUTE DIARRHEA: ICD-10-CM

## 2022-08-17 DIAGNOSIS — D50.8 ACQUIRED IRON DEFICIENCY ANEMIA DUE TO DECREASED ABSORPTION: ICD-10-CM

## 2022-08-17 DIAGNOSIS — K50.90 ABDOMINAL PAIN DESPITE THERAPY FOR CROHN'S DISEASE: ICD-10-CM

## 2022-08-17 PROCEDURE — 99222 1ST HOSP IP/OBS MODERATE 55: CPT | Performed by: INTERNAL MEDICINE

## 2022-08-17 PROCEDURE — 99232 SBSQ HOSP IP/OBS MODERATE 35: CPT | Performed by: INTERNAL MEDICINE

## 2022-08-17 PROCEDURE — G8427 DOCREV CUR MEDS BY ELIG CLIN: HCPCS | Performed by: INTERNAL MEDICINE

## 2022-08-19 ENCOUNTER — OUT OF OFFICE VISIT (OUTPATIENT)
Dept: URBAN - METROPOLITAN AREA MEDICAL CENTER 33 | Facility: MEDICAL CENTER | Age: 52
End: 2022-08-19
Payer: SELF-PAY

## 2022-08-19 DIAGNOSIS — K92.1 ACUTE MELENA: ICD-10-CM

## 2022-08-19 DIAGNOSIS — D50.9 ANEMIA: ICD-10-CM

## 2022-08-19 PROCEDURE — 44360 SMALL BOWEL ENDOSCOPY: CPT | Performed by: INTERNAL MEDICINE

## 2022-08-22 ENCOUNTER — WEB ENCOUNTER (OUTPATIENT)
Dept: URBAN - METROPOLITAN AREA CLINIC 98 | Facility: CLINIC | Age: 52
End: 2022-08-22

## 2022-09-12 ENCOUNTER — OFFICE VISIT (OUTPATIENT)
Dept: URBAN - METROPOLITAN AREA CLINIC 97 | Facility: CLINIC | Age: 52
End: 2022-09-12
Payer: SELF-PAY

## 2022-09-12 VITALS
TEMPERATURE: 98.6 F | BODY MASS INDEX: 22.81 KG/M2 | WEIGHT: 154 LBS | HEIGHT: 69 IN | SYSTOLIC BLOOD PRESSURE: 129 MMHG | HEART RATE: 77 BPM | RESPIRATION RATE: 20 BRPM | DIASTOLIC BLOOD PRESSURE: 77 MMHG

## 2022-09-12 DIAGNOSIS — K50.80 CROHN'S COLITIS: ICD-10-CM

## 2022-09-12 PROCEDURE — 96375 TX/PRO/DX INJ NEW DRUG ADDON: CPT | Performed by: INTERNAL MEDICINE

## 2022-09-12 PROCEDURE — 96413 CHEMO IV INFUSION 1 HR: CPT | Performed by: INTERNAL MEDICINE

## 2022-09-12 PROCEDURE — 96415 CHEMO IV INFUSION ADDL HR: CPT | Performed by: INTERNAL MEDICINE

## 2022-09-12 RX ORDER — ERGOCALCIFEROL 1.25 MG/1
CAPSULE ORAL
Qty: 0 | Refills: 0 | COMMUNITY
Start: 1900-01-01

## 2022-09-12 RX ORDER — ONDANSETRON HYDROCHLORIDE 4 MG/1
1 TABLET TABLET, FILM COATED ORAL
Qty: 60 | Refills: 1 | Status: ACTIVE | COMMUNITY

## 2022-09-12 RX ORDER — SUCRALFATE 1 G/1
1 TABLET ON AN EMPTY STOMACH TABLET ORAL TWICE A DAY
Qty: 60 TABLET | Refills: 4 | Status: ACTIVE | COMMUNITY

## 2022-09-12 RX ORDER — FAMOTIDINE 40 MG/1
1 TAB TABLET, FILM COATED ORAL BID
Qty: 60 TABLET | Refills: 11 | Status: ACTIVE | COMMUNITY

## 2022-09-12 RX ORDER — ONDANSETRON 4 MG/1
DISSOLVE ONE TABLET BY MOUTH EVERY 6 HOURS AS NEEDED FOR NAUSEA AND VOMITING TABLET, ORALLY DISINTEGRATING ORAL
Qty: 60 | Refills: 0 | COMMUNITY

## 2022-09-12 RX ORDER — MYCOPHENOLATE MOFETIL 250 MG/1
TAKE 4 CAPSULES (1,000 MG) BY ORAL ROUTE 2 TIMES PER DAY CAPSULE ORAL 2
Qty: 0 | Refills: 0 | COMMUNITY
Start: 1900-01-01

## 2022-09-12 RX ORDER — PANTOPRAZOLE SODIUM 40 MG/1
1 TABLET TABLET, DELAYED RELEASE ORAL BID
Qty: 60 TABLET | Refills: 11 | Status: ACTIVE | COMMUNITY

## 2022-11-28 ENCOUNTER — DASHBOARD ENCOUNTERS (OUTPATIENT)
Age: 52
End: 2022-11-28

## 2022-11-28 ENCOUNTER — OFFICE VISIT (OUTPATIENT)
Dept: URBAN - METROPOLITAN AREA CLINIC 82 | Facility: CLINIC | Age: 52
End: 2022-11-28
Payer: SELF-PAY

## 2022-11-28 VITALS
TEMPERATURE: 98 F | SYSTOLIC BLOOD PRESSURE: 144 MMHG | WEIGHT: 149 LBS | BODY MASS INDEX: 22.07 KG/M2 | HEART RATE: 125 BPM | HEIGHT: 69 IN | DIASTOLIC BLOOD PRESSURE: 92 MMHG

## 2022-11-28 DIAGNOSIS — Z94.4 LIVER TRANSPLANT: ICD-10-CM

## 2022-11-28 DIAGNOSIS — K50.80 CROHN'S DISEASE OF BOTH SMALL AND LARGE INTESTINE WITHOUT COMPLICATION: ICD-10-CM

## 2022-11-28 DIAGNOSIS — R19.7 DIARRHEA: ICD-10-CM

## 2022-11-28 DIAGNOSIS — K21.9 GERD (GASTROESOPHAGEAL REFLUX DISEASE): ICD-10-CM

## 2022-11-28 DIAGNOSIS — Z09 FOLLOW UP: ICD-10-CM

## 2022-11-28 DIAGNOSIS — Z79.899 LONG-TERM USE OF IMMUNOSUPPRESSANT MEDICATION: ICD-10-CM

## 2022-11-28 DIAGNOSIS — D50.0 IRON DEFICIENCY ANEMIA DUE TO CHRONIC BLOOD LOSS: ICD-10-CM

## 2022-11-28 PROBLEM — 235595009 GASTROESOPHAGEAL REFLUX DISEASE: Status: ACTIVE | Noted: 2021-04-21

## 2022-11-28 PROBLEM — 62315008 DIARRHEA: Status: ACTIVE | Noted: 2022-11-28

## 2022-11-28 PROBLEM — 161671001 HISTORY OF LIVER RECIPIENT: Status: ACTIVE | Noted: 2022-11-28

## 2022-11-28 PROBLEM — 710814002 LONG-TERM CURRENT USE OF DRUG THERAPY: Status: ACTIVE | Noted: 2022-11-28

## 2022-11-28 PROBLEM — 724556004: Status: ACTIVE | Noted: 2022-11-28

## 2022-11-28 PROBLEM — 308273005 FOLLOW-UP STATUS: Status: ACTIVE | Noted: 2022-11-28

## 2022-11-28 PROCEDURE — 99214 OFFICE O/P EST MOD 30 MIN: CPT | Performed by: INTERNAL MEDICINE

## 2022-11-28 RX ORDER — PANTOPRAZOLE SODIUM 40 MG/1
1 TABLET TABLET, DELAYED RELEASE ORAL BID
Qty: 60 TABLET | Refills: 11 | OUTPATIENT

## 2022-11-28 RX ORDER — PREDNISONE 5 MG/1
6 TABS 1 WEEK TAPER BY 5 MG LESS EVERY WEEK IN 6 WEEKS TABLET ORAL ONCE A DAY
Qty: 121 | Refills: 1 | OUTPATIENT
Start: 2022-11-28 | End: 2023-02-26

## 2022-11-28 RX ORDER — PANTOPRAZOLE SODIUM 40 MG/1
1 TABLET TABLET, DELAYED RELEASE ORAL BID
Qty: 60 TABLET | Refills: 11 | Status: ACTIVE | COMMUNITY

## 2022-11-28 RX ORDER — SUCRALFATE 1 G/1
1 TABLET ON AN EMPTY STOMACH TABLET ORAL TWICE A DAY
Qty: 60 TABLET | Refills: 4 | Status: DISCONTINUED | COMMUNITY

## 2022-11-28 RX ORDER — MYCOPHENOLATE MOFETIL 250 MG/1
TAKE 4 CAPSULES (1,000 MG) BY ORAL ROUTE 2 TIMES PER DAY CAPSULE ORAL 2
Qty: 0 | Refills: 0 | Status: DISCONTINUED | COMMUNITY
Start: 1900-01-01

## 2022-11-28 RX ORDER — ONDANSETRON HYDROCHLORIDE 4 MG/1
1 TABLET TABLET, FILM COATED ORAL
Qty: 60 | Refills: 1 | Status: ACTIVE | COMMUNITY

## 2022-11-28 RX ORDER — ERGOCALCIFEROL 1.25 MG/1
CAPSULE ORAL
Qty: 0 | Refills: 0 | Status: ACTIVE | COMMUNITY
Start: 1900-01-01

## 2022-11-28 RX ORDER — ONDANSETRON HYDROCHLORIDE 4 MG/1
1 TABLET TABLET, FILM COATED ORAL
Qty: 60 | Refills: 1 | OUTPATIENT

## 2022-11-28 RX ORDER — ONDANSETRON 4 MG/1
DISSOLVE ONE TABLET BY MOUTH EVERY 6 HOURS AS NEEDED FOR NAUSEA AND VOMITING TABLET, ORALLY DISINTEGRATING ORAL
Qty: 60 | Refills: 0 | Status: ACTIVE | COMMUNITY

## 2022-11-28 RX ORDER — FAMOTIDINE 40 MG/1
1 TAB TABLET, FILM COATED ORAL BID
Qty: 60 TABLET | Refills: 11 | Status: ACTIVE | COMMUNITY

## 2022-11-28 RX ORDER — FAMOTIDINE 40 MG/1
1 TAB TABLET, FILM COATED ORAL BID
Qty: 60 TABLET | Refills: 11 | OUTPATIENT

## 2022-11-28 NOTE — PHYSICAL EXAM CHEST:
ASSESSMENT/PLAN:    DYSURIA - Increase fluids. May use tylenol as directed as needed for fever or pain. If worsening symptoms or not improving within 72 hours follow-up with primary care provider or urologist. Culture will be performed to confirm infection and, if positive, to ensure appropriate antibiotic therapy.    Diagnosis and prognosis, treatment and side-effects were explained and all questions were answered satisfactorily and there were no further questions upon discharge.       chest wall non-tender, breathing is unlabored without accessory muscle use, normal breath sounds

## 2022-11-28 NOTE — HPI-TODAY'S VISIT:
Ms. Sandoval was seen today for complaints of having a severe anemia.  Patient had been following up with Dr. Urban for her Crohn's disease.  She reports her that Entyvio did not help her and hence she went back onto Remicade 4 months ago.  Presents patient continued to have a ileitis and iron deficiency anemia she was reduced dose has been reduced to every 4 weeks.  Patient stated she was hospitalized multiple times because of the profound anemia.  She had multiple ileoscopy's as well as small bowel enteroscopy's.  Biopsies were consistent with active Crohn's disease and also granulomas and granulomatous polyp.  Patient reports having bleeding through the ileostomy center few times and she has pictures of ileostomy bleed.  Patient reports blood comes from the lumen not from the ileostomy site.  Patient had been receiving blood transfusions almost on a monthly basis for the past 4 months.  Patient reports fatigue tiredness weakness joint pains and aches.  Because of insurance issues she has not had a Remicade in the past 4 to 6 weeks.  Patient reports not able to work because of her active Crohn's disease.  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

## 2023-03-02 ENCOUNTER — CLAIMS CREATED FROM THE CLAIM WINDOW (OUTPATIENT)
Dept: URBAN - METROPOLITAN AREA MEDICAL CENTER 33 | Facility: MEDICAL CENTER | Age: 53
End: 2023-03-02
Payer: SELF-PAY

## 2023-03-02 DIAGNOSIS — R10.13 EPIGASTRIC PAIN: ICD-10-CM

## 2023-03-02 DIAGNOSIS — R10.11 ABDOMINAL BURNING SENSATION IN RIGHT UPPER QUADRANT: ICD-10-CM

## 2023-03-02 DIAGNOSIS — K50.90 ABDOMINAL PAIN DESPITE THERAPY FOR CROHN'S DISEASE: ICD-10-CM

## 2023-03-02 DIAGNOSIS — D50.8 ACQUIRED IRON DEFICIENCY ANEMIA DUE TO DECREASED ABSORPTION: ICD-10-CM

## 2023-03-02 DIAGNOSIS — R11.0 AM NAUSEA: ICD-10-CM

## 2023-03-02 PROCEDURE — 99254 IP/OBS CNSLTJ NEW/EST MOD 60: CPT | Performed by: INTERNAL MEDICINE

## 2023-03-02 PROCEDURE — 99254 IP/OBS CNSLTJ NEW/EST MOD 60: CPT | Performed by: PHYSICIAN ASSISTANT

## 2023-04-09 ENCOUNTER — CLAIMS CREATED FROM THE CLAIM WINDOW (OUTPATIENT)
Dept: URBAN - METROPOLITAN AREA MEDICAL CENTER 33 | Facility: MEDICAL CENTER | Age: 53
End: 2023-04-09

## 2023-04-09 ENCOUNTER — CLAIMS CREATED FROM THE CLAIM WINDOW (OUTPATIENT)
Dept: URBAN - METROPOLITAN AREA MEDICAL CENTER 33 | Facility: MEDICAL CENTER | Age: 53
End: 2023-04-09
Payer: SELF-PAY

## 2023-04-09 DIAGNOSIS — D50.9 ANEMIA: ICD-10-CM

## 2023-04-09 DIAGNOSIS — R79.89 ABNORMAL BILIRUBIN TEST: ICD-10-CM

## 2023-04-09 DIAGNOSIS — K92.2 ACUTE GASTROINTESTINAL BLEEDING: ICD-10-CM

## 2023-04-09 DIAGNOSIS — K50.10 CC (CROHN'S COLITIS): ICD-10-CM

## 2023-04-09 PROCEDURE — 99254 IP/OBS CNSLTJ NEW/EST MOD 60: CPT | Performed by: INTERNAL MEDICINE

## 2023-04-10 ENCOUNTER — CLAIMS CREATED FROM THE CLAIM WINDOW (OUTPATIENT)
Dept: URBAN - METROPOLITAN AREA MEDICAL CENTER 33 | Facility: MEDICAL CENTER | Age: 53
End: 2023-04-10
Payer: SELF-PAY

## 2023-04-10 DIAGNOSIS — D50.9 IRON DEFICIENCY ANEMIA, UNSPECIFIED: ICD-10-CM

## 2023-04-10 DIAGNOSIS — K92.2 GASTROINTESTINAL HEMORRHAGE, UNSPECIFIED: ICD-10-CM

## 2023-04-10 DIAGNOSIS — K92.2 ACUTE GASTROINTESTINAL BLEEDING: ICD-10-CM

## 2023-04-10 PROCEDURE — 99232 SBSQ HOSP IP/OBS MODERATE 35: CPT | Performed by: INTERNAL MEDICINE

## 2023-04-11 ENCOUNTER — CLAIMS CREATED FROM THE CLAIM WINDOW (OUTPATIENT)
Dept: URBAN - METROPOLITAN AREA MEDICAL CENTER 33 | Facility: MEDICAL CENTER | Age: 53
End: 2023-04-11

## 2023-04-11 ENCOUNTER — CLAIMS CREATED FROM THE CLAIM WINDOW (OUTPATIENT)
Dept: URBAN - METROPOLITAN AREA MEDICAL CENTER 33 | Facility: MEDICAL CENTER | Age: 53
End: 2023-04-11
Payer: SELF-PAY

## 2023-04-11 DIAGNOSIS — K63.89 APPENDICITIS EPIPLOICA: ICD-10-CM

## 2023-04-11 DIAGNOSIS — D64.89 ANEMIA DUE TO OTHER CAUSE: ICD-10-CM

## 2023-04-11 PROCEDURE — 91110 GI TRC IMG INTRAL ESOPH-ILE: CPT | Performed by: INTERNAL MEDICINE

## 2023-06-01 ENCOUNTER — CLAIMS CREATED FROM THE CLAIM WINDOW (OUTPATIENT)
Dept: URBAN - METROPOLITAN AREA MEDICAL CENTER 10 | Facility: MEDICAL CENTER | Age: 53
End: 2023-06-01
Payer: SELF-PAY

## 2023-06-01 ENCOUNTER — OUT OF OFFICE VISIT (OUTPATIENT)
Dept: URBAN - METROPOLITAN AREA MEDICAL CENTER 10 | Facility: MEDICAL CENTER | Age: 53
End: 2023-06-01

## 2023-06-01 DIAGNOSIS — D64.89 ANEMIA DUE TO OTHER CAUSE: ICD-10-CM

## 2023-06-01 PROCEDURE — 99254 IP/OBS CNSLTJ NEW/EST MOD 60: CPT | Performed by: INTERNAL MEDICINE

## 2023-07-07 ENCOUNTER — OUT OF OFFICE VISIT (OUTPATIENT)
Dept: URBAN - METROPOLITAN AREA MEDICAL CENTER 24 | Facility: MEDICAL CENTER | Age: 53
End: 2023-07-07
Payer: SELF-PAY

## 2023-07-07 DIAGNOSIS — D50.9 ANEMIA: ICD-10-CM

## 2023-07-07 DIAGNOSIS — K50.90 ABDOMINAL PAIN DESPITE THERAPY FOR CROHN'S DISEASE: ICD-10-CM

## 2023-07-07 DIAGNOSIS — K83.01 PSC (PRIMARY SCLEROSING CHOLANGITIS): ICD-10-CM

## 2023-07-07 DIAGNOSIS — Z94.4 H/O LIVER TRANSPLANT: ICD-10-CM

## 2023-07-07 PROCEDURE — 99232 SBSQ HOSP IP/OBS MODERATE 35: CPT | Performed by: INTERNAL MEDICINE

## 2023-07-07 PROCEDURE — 99254 IP/OBS CNSLTJ NEW/EST MOD 60: CPT | Performed by: INTERNAL MEDICINE

## 2023-07-12 ENCOUNTER — OUT OF OFFICE VISIT (OUTPATIENT)
Dept: URBAN - METROPOLITAN AREA MEDICAL CENTER 24 | Facility: MEDICAL CENTER | Age: 53
End: 2023-07-12
Payer: SELF-PAY

## 2023-07-12 DIAGNOSIS — K92.2 ACUTE GASTROINTESTINAL BLEEDING: ICD-10-CM

## 2023-07-12 PROCEDURE — 44380 SMALL BOWEL ENDOSCOPY BR/WA: CPT | Performed by: INTERNAL MEDICINE

## 2023-07-13 ENCOUNTER — OUT OF OFFICE VISIT (OUTPATIENT)
Dept: URBAN - METROPOLITAN AREA MEDICAL CENTER 24 | Facility: MEDICAL CENTER | Age: 53
End: 2023-07-13
Payer: SELF-PAY

## 2023-07-13 DIAGNOSIS — D50.9 ANEMIA: ICD-10-CM

## 2023-07-13 DIAGNOSIS — K83.01 PRIMARY SCLEROSING CHOLANGITIS: ICD-10-CM

## 2023-07-13 DIAGNOSIS — K92.2 ACUTE GASTROINTESTINAL BLEEDING: ICD-10-CM

## 2023-07-13 DIAGNOSIS — K50.90 ABDOMINAL PAIN DESPITE THERAPY FOR CROHN'S DISEASE: ICD-10-CM

## 2023-07-13 PROCEDURE — 99232 SBSQ HOSP IP/OBS MODERATE 35: CPT | Performed by: INTERNAL MEDICINE

## 2023-07-15 ENCOUNTER — CLAIMS CREATED FROM THE CLAIM WINDOW (OUTPATIENT)
Dept: URBAN - METROPOLITAN AREA MEDICAL CENTER 24 | Facility: MEDICAL CENTER | Age: 53
End: 2023-07-15

## 2023-07-15 ENCOUNTER — CLAIMS CREATED FROM THE CLAIM WINDOW (OUTPATIENT)
Dept: URBAN - METROPOLITAN AREA MEDICAL CENTER 24 | Facility: MEDICAL CENTER | Age: 53
End: 2023-07-15
Payer: SELF-PAY

## 2023-07-15 DIAGNOSIS — Z94.4 H/O LIVER TRANSPLANT: ICD-10-CM

## 2023-07-15 DIAGNOSIS — R79.89 ABNORMAL BILIRUBIN TEST: ICD-10-CM

## 2023-07-15 DIAGNOSIS — R17 CHOLESTATIC JAUNDICE: ICD-10-CM

## 2023-07-15 PROCEDURE — 99232 SBSQ HOSP IP/OBS MODERATE 35: CPT | Performed by: INTERNAL MEDICINE

## 2023-07-15 PROCEDURE — 99233 SBSQ HOSP IP/OBS HIGH 50: CPT | Performed by: INTERNAL MEDICINE

## 2023-07-17 ENCOUNTER — CLAIMS CREATED FROM THE CLAIM WINDOW (OUTPATIENT)
Dept: URBAN - METROPOLITAN AREA MEDICAL CENTER 24 | Facility: MEDICAL CENTER | Age: 53
End: 2023-07-17
Payer: SELF-PAY

## 2023-07-17 ENCOUNTER — OUT OF OFFICE VISIT (OUTPATIENT)
Dept: URBAN - METROPOLITAN AREA MEDICAL CENTER 24 | Facility: MEDICAL CENTER | Age: 53
End: 2023-07-17

## 2023-07-17 DIAGNOSIS — K50.90 ABDOMINAL PAIN DESPITE THERAPY FOR CROHN'S DISEASE: ICD-10-CM

## 2023-07-17 DIAGNOSIS — Z94.4 H/O LIVER TRANSPLANT: ICD-10-CM

## 2023-07-17 DIAGNOSIS — R79.89 ABNORMAL BILIRUBIN TEST: ICD-10-CM

## 2023-07-17 DIAGNOSIS — D50.0 1. ANEMIA, IRON DEFICIENCY FROM CHRONIC BLOOD LOSS:: ICD-10-CM

## 2023-07-17 PROCEDURE — 99232 SBSQ HOSP IP/OBS MODERATE 35: CPT | Performed by: INTERNAL MEDICINE

## 2023-07-24 ENCOUNTER — CLAIMS CREATED FROM THE CLAIM WINDOW (OUTPATIENT)
Dept: URBAN - METROPOLITAN AREA MEDICAL CENTER 33 | Facility: MEDICAL CENTER | Age: 53
End: 2023-07-24
Payer: SELF-PAY

## 2023-07-24 DIAGNOSIS — K50.10 CROHN'S DISEASE OF LARGE INTESTINE WITHOUT COMPLICATIONS: ICD-10-CM

## 2023-07-24 DIAGNOSIS — K72.10 CHRONIC HEPATIC FAILURE WITHOUT COMA: ICD-10-CM

## 2023-07-24 DIAGNOSIS — K83.01 PRIMARY SCLEROSING CHOLANGITIS: ICD-10-CM

## 2023-07-24 DIAGNOSIS — K50.90 ABDOMINAL PAIN DESPITE THERAPY FOR CROHN'S DISEASE: ICD-10-CM

## 2023-07-24 DIAGNOSIS — K92.2 ACUTE GASTROINTESTINAL BLEEDING: ICD-10-CM

## 2023-07-24 DIAGNOSIS — D50.8 ACHLORHYDRIC ANEMIA: ICD-10-CM

## 2023-07-24 DIAGNOSIS — R10.11 ABDOMINAL BURNING SENSATION IN RIGHT UPPER QUADRANT: ICD-10-CM

## 2023-07-24 PROCEDURE — 99255 IP/OBS CONSLTJ NEW/EST HI 80: CPT | Performed by: INTERNAL MEDICINE

## 2023-07-25 ENCOUNTER — CLAIMS CREATED FROM THE CLAIM WINDOW (OUTPATIENT)
Dept: URBAN - METROPOLITAN AREA MEDICAL CENTER 33 | Facility: MEDICAL CENTER | Age: 53
End: 2023-07-25

## 2023-07-25 ENCOUNTER — CLAIMS CREATED FROM THE CLAIM WINDOW (OUTPATIENT)
Dept: URBAN - METROPOLITAN AREA MEDICAL CENTER 33 | Facility: MEDICAL CENTER | Age: 53
End: 2023-07-25
Payer: SELF-PAY

## 2023-07-25 DIAGNOSIS — D50.8 ACHLORHYDRIC ANEMIA: ICD-10-CM

## 2023-07-25 DIAGNOSIS — R10.11 ABDOMINAL BURNING SENSATION IN RIGHT UPPER QUADRANT: ICD-10-CM

## 2023-07-25 DIAGNOSIS — K50.90 ABDOMINAL PAIN DESPITE THERAPY FOR CROHN'S DISEASE: ICD-10-CM

## 2023-07-25 DIAGNOSIS — K92.2 ACUTE GASTROINTESTINAL BLEEDING: ICD-10-CM

## 2023-07-25 PROCEDURE — 99233 SBSQ HOSP IP/OBS HIGH 50: CPT | Performed by: INTERNAL MEDICINE

## 2023-07-25 PROCEDURE — 99233 SBSQ HOSP IP/OBS HIGH 50: CPT | Performed by: PHYSICIAN ASSISTANT

## 2023-07-26 ENCOUNTER — CLAIMS CREATED FROM THE CLAIM WINDOW (OUTPATIENT)
Dept: URBAN - METROPOLITAN AREA MEDICAL CENTER 33 | Facility: MEDICAL CENTER | Age: 53
End: 2023-07-26

## 2023-07-26 ENCOUNTER — CLAIMS CREATED FROM THE CLAIM WINDOW (OUTPATIENT)
Dept: URBAN - METROPOLITAN AREA MEDICAL CENTER 33 | Facility: MEDICAL CENTER | Age: 53
End: 2023-07-26
Payer: SELF-PAY

## 2023-07-26 DIAGNOSIS — K50.90 ABDOMINAL PAIN DESPITE THERAPY FOR CROHN'S DISEASE: ICD-10-CM

## 2023-07-26 DIAGNOSIS — D50.8 ACHLORHYDRIC ANEMIA: ICD-10-CM

## 2023-07-26 DIAGNOSIS — K94.11 ENTEROSTOMY HEMORRHAGE: ICD-10-CM

## 2023-07-26 DIAGNOSIS — R79.89 ABNORMAL BILIRUBIN TEST: ICD-10-CM

## 2023-07-26 PROCEDURE — 99233 SBSQ HOSP IP/OBS HIGH 50: CPT | Performed by: PHYSICIAN ASSISTANT

## 2023-07-26 PROCEDURE — 99233 SBSQ HOSP IP/OBS HIGH 50: CPT | Performed by: INTERNAL MEDICINE

## 2023-07-27 ENCOUNTER — CLAIMS CREATED FROM THE CLAIM WINDOW (OUTPATIENT)
Dept: URBAN - METROPOLITAN AREA MEDICAL CENTER 33 | Facility: MEDICAL CENTER | Age: 53
End: 2023-07-27

## 2023-07-27 ENCOUNTER — CLAIMS CREATED FROM THE CLAIM WINDOW (OUTPATIENT)
Dept: URBAN - METROPOLITAN AREA MEDICAL CENTER 33 | Facility: MEDICAL CENTER | Age: 53
End: 2023-07-27
Payer: SELF-PAY

## 2023-07-27 DIAGNOSIS — K94.11 ENTEROSTOMY HEMORRHAGE: ICD-10-CM

## 2023-07-27 DIAGNOSIS — D50.8 ACHLORHYDRIC ANEMIA: ICD-10-CM

## 2023-07-27 DIAGNOSIS — K50.90 ABDOMINAL PAIN DESPITE THERAPY FOR CROHN'S DISEASE: ICD-10-CM

## 2023-07-27 DIAGNOSIS — R79.89 ABNORMAL BILIRUBIN TEST: ICD-10-CM

## 2023-07-27 PROCEDURE — 99233 SBSQ HOSP IP/OBS HIGH 50: CPT | Performed by: PHYSICIAN ASSISTANT

## 2023-07-27 PROCEDURE — 99233 SBSQ HOSP IP/OBS HIGH 50: CPT | Performed by: INTERNAL MEDICINE

## 2023-07-28 ENCOUNTER — CLAIMS CREATED FROM THE CLAIM WINDOW (OUTPATIENT)
Dept: URBAN - METROPOLITAN AREA MEDICAL CENTER 33 | Facility: MEDICAL CENTER | Age: 53
End: 2023-07-28
Payer: SELF-PAY

## 2023-07-28 ENCOUNTER — CLAIMS CREATED FROM THE CLAIM WINDOW (OUTPATIENT)
Dept: URBAN - METROPOLITAN AREA MEDICAL CENTER 33 | Facility: MEDICAL CENTER | Age: 53
End: 2023-07-28

## 2023-07-28 DIAGNOSIS — K50.90 ABDOMINAL PAIN DESPITE THERAPY FOR CROHN'S DISEASE: ICD-10-CM

## 2023-07-28 DIAGNOSIS — D50.8 ACHLORHYDRIC ANEMIA: ICD-10-CM

## 2023-07-28 DIAGNOSIS — K92.2 ACUTE GASTROINTESTINAL BLEEDING: ICD-10-CM

## 2023-07-28 DIAGNOSIS — R10.11 ABDOMINAL BURNING SENSATION IN RIGHT UPPER QUADRANT: ICD-10-CM

## 2023-07-28 PROCEDURE — 99233 SBSQ HOSP IP/OBS HIGH 50: CPT | Performed by: PHYSICIAN ASSISTANT

## 2023-07-28 PROCEDURE — 99233 SBSQ HOSP IP/OBS HIGH 50: CPT | Performed by: INTERNAL MEDICINE

## 2023-07-31 ENCOUNTER — OUT OF OFFICE VISIT (OUTPATIENT)
Dept: URBAN - METROPOLITAN AREA MEDICAL CENTER 33 | Facility: MEDICAL CENTER | Age: 53
End: 2023-07-31
Payer: SELF-PAY

## 2023-07-31 DIAGNOSIS — K92.2 ACUTE GASTROINTESTINAL BLEEDING: ICD-10-CM

## 2023-07-31 DIAGNOSIS — R10.11 ABDOMINAL BURNING SENSATION IN RIGHT UPPER QUADRANT: ICD-10-CM

## 2023-07-31 DIAGNOSIS — K50.90 ABDOMINAL PAIN DESPITE THERAPY FOR CROHN'S DISEASE: ICD-10-CM

## 2023-07-31 DIAGNOSIS — D50.8 ACHLORHYDRIC ANEMIA: ICD-10-CM

## 2023-07-31 PROCEDURE — 99232 SBSQ HOSP IP/OBS MODERATE 35: CPT | Performed by: INTERNAL MEDICINE
